# Patient Record
Sex: FEMALE | Race: WHITE | NOT HISPANIC OR LATINO | URBAN - METROPOLITAN AREA
[De-identification: names, ages, dates, MRNs, and addresses within clinical notes are randomized per-mention and may not be internally consistent; named-entity substitution may affect disease eponyms.]

---

## 2023-12-26 ENCOUNTER — OFFICE VISIT (OUTPATIENT)
Dept: PRIMARY CARE | Facility: CLINIC | Age: 58
End: 2023-12-26
Payer: COMMERCIAL

## 2023-12-26 VITALS
BODY MASS INDEX: 26.39 KG/M2 | HEART RATE: 53 BPM | WEIGHT: 149 LBS | SYSTOLIC BLOOD PRESSURE: 122 MMHG | OXYGEN SATURATION: 98 % | DIASTOLIC BLOOD PRESSURE: 80 MMHG

## 2023-12-26 DIAGNOSIS — J45.20 MILD INTERMITTENT ASTHMA WITHOUT COMPLICATION (HHS-HCC): ICD-10-CM

## 2023-12-26 DIAGNOSIS — F41.9 ANXIETY: Primary | ICD-10-CM

## 2023-12-26 PROBLEM — M51.26 LUMBAR DISC HERNIATION: Status: ACTIVE | Noted: 2023-12-26

## 2023-12-26 PROBLEM — M16.10 PRIMARY LOCALIZED OSTEOARTHRITIS OF PELVIC REGION AND THIGH: Status: ACTIVE | Noted: 2023-12-26

## 2023-12-26 PROBLEM — E78.5 HYPERLIPIDEMIA: Status: ACTIVE | Noted: 2023-12-26

## 2023-12-26 PROBLEM — G89.29 OTHER CHRONIC PAIN: Status: ACTIVE | Noted: 2023-12-26

## 2023-12-26 PROBLEM — J01.90 ACUTE NON-RECURRENT SINUSITIS: Status: ACTIVE | Noted: 2023-12-26

## 2023-12-26 PROBLEM — M50.30 DDD (DEGENERATIVE DISC DISEASE), CERVICAL: Status: ACTIVE | Noted: 2023-12-26

## 2023-12-26 PROBLEM — M25.551 RIGHT HIP PAIN: Status: ACTIVE | Noted: 2023-12-26

## 2023-12-26 PROBLEM — K52.9 COLITIS: Status: ACTIVE | Noted: 2023-12-26

## 2023-12-26 PROBLEM — S16.1XXA CERVICAL STRAIN: Status: ACTIVE | Noted: 2023-12-26

## 2023-12-26 PROBLEM — F90.9 ATTENTION DEFICIT HYPERACTIVITY DISORDER (ADHD): Status: ACTIVE | Noted: 2023-12-26

## 2023-12-26 PROBLEM — N18.31 STAGE 3A CHRONIC KIDNEY DISEASE (MULTI): Status: ACTIVE | Noted: 2023-12-26

## 2023-12-26 PROBLEM — K62.5 RECTAL BLEEDING: Status: ACTIVE | Noted: 2023-12-26
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
## 2023-12-26 PROBLEM — M16.12 PRIMARY OSTEOARTHRITIS OF LEFT HIP: Status: ACTIVE | Noted: 2023-12-26

## 2023-12-26 PROBLEM — N95.2 ATROPHIC VAGINITIS: Status: ACTIVE | Noted: 2023-12-26

## 2023-12-26 PROBLEM — S73.199A TEAR OF ACETABULAR LABRUM: Status: ACTIVE | Noted: 2023-12-26

## 2023-12-26 PROBLEM — S39.012A LUMBAR STRAIN: Status: ACTIVE | Noted: 2023-12-26

## 2023-12-26 PROCEDURE — 1036F TOBACCO NON-USER: CPT | Performed by: STUDENT IN AN ORGANIZED HEALTH CARE EDUCATION/TRAINING PROGRAM

## 2023-12-26 PROCEDURE — 99213 OFFICE O/P EST LOW 20 MIN: CPT | Performed by: STUDENT IN AN ORGANIZED HEALTH CARE EDUCATION/TRAINING PROGRAM

## 2023-12-26 RX ORDER — ERGOCALCIFEROL 1.25 MG/1
1.25 CAPSULE ORAL
COMMUNITY
End: 2024-05-17 | Stop reason: WASHOUT

## 2023-12-26 RX ORDER — HYDROXYZINE HYDROCHLORIDE 25 MG/1
25 TABLET, FILM COATED ORAL 3 TIMES DAILY
Qty: 30 TABLET | Refills: 0 | Status: SHIPPED | OUTPATIENT
Start: 2023-12-26 | End: 2024-03-18 | Stop reason: WASHOUT

## 2023-12-26 RX ORDER — MAGNESIUM OXIDE/MAG AA CHELATE 300 MG
CAPSULE ORAL EVERY 24 HOURS
COMMUNITY
End: 2024-05-17 | Stop reason: WASHOUT

## 2023-12-26 RX ORDER — TERBINAFINE HYDROCHLORIDE 250 MG/1
250 TABLET ORAL DAILY
COMMUNITY
End: 2023-12-26 | Stop reason: WASHOUT

## 2023-12-26 RX ORDER — ALBUTEROL SULFATE 90 UG/1
2 AEROSOL, METERED RESPIRATORY (INHALATION) EVERY 6 HOURS PRN
COMMUNITY
Start: 2017-09-20 | End: 2023-12-26 | Stop reason: SDUPTHER

## 2023-12-26 RX ORDER — ALBUTEROL SULFATE 90 UG/1
2 AEROSOL, METERED RESPIRATORY (INHALATION) EVERY 6 HOURS PRN
Qty: 18 G | Refills: 1 | Status: SHIPPED | OUTPATIENT
Start: 2023-12-26 | End: 2024-03-28 | Stop reason: SDUPTHER

## 2023-12-26 ASSESSMENT — PATIENT HEALTH QUESTIONNAIRE - PHQ9
2. FEELING DOWN, DEPRESSED OR HOPELESS: NOT AT ALL
1. LITTLE INTEREST OR PLEASURE IN DOING THINGS: NOT AT ALL
SUM OF ALL RESPONSES TO PHQ9 QUESTIONS 1 AND 2: 0

## 2023-12-26 NOTE — PROGRESS NOTES
Subjective   Patient ID: Trupti Medrano is a 58 y.o. female who presents for New Med Request (Pt is here requesting medication that she can take prior to surgery for her anxiety.-- full hip replacement).    HPI  59 yo female here for hip replacement  Anxiety  Is getting R hip surgery on Jan 2  Done at [facility]  2. Asthma  Cold and exercise induced  Needs refill    Review of Systems  All pertinent positive symptoms are included in the history of present illness.    All other systems have been reviewed and are negative and noncontributory to this patient's current ailments.     No Known Allergies     Immunization History   Administered Date(s) Administered    Pfizer Purple Cap SARS-CoV-2 03/10/2021, 04/09/2021, 12/02/2021       Objective   Vitals:    12/26/23 1007   BP: 122/80   BP Location: Left arm   Patient Position: Sitting   Pulse: 53   SpO2: 98%   Weight: 67.6 kg (149 lb)       Physical Exam  CONSTITUTIONAL - well nourished, well developed, looks like stated age, in no acute distress  SKIN - normal skin color and pigmentation, normal skin turgor without rash, lesions, or nodules visualized  HEAD - no trauma, normocephalic  EYES - extraocular muscles are intact  ENT - atraumatic  NECK - no neck mass was observed  LUNGS - CTA B/L  CARDIAC - regular rate and regular rhythm, no skipped beats, no murmur appreciated  ABDOMEN - no organomegaly, soft, nontender, nondistended, no guarding/rebound/rigidity  EXTREMITIES - no edema, no deformities  MSK - moves limbs equally  NEUROLOGICAL - normal gait, normal balance, alert, oriented and no focal signs  PSYCHIATRIC - alert, pleasant and cordial, age-appropriate  IMMUNOLOGIC - no cervical lymphadenopathy     Assessment/Plan   59 yo female here for hip replacement  Anxiety  Start hydroxyzine prn  2. Asthma  Albuterol prn

## 2023-12-29 ENCOUNTER — TELEPHONE (OUTPATIENT)
Dept: PRIMARY CARE | Facility: CLINIC | Age: 58
End: 2023-12-29
Payer: COMMERCIAL

## 2023-12-29 NOTE — TELEPHONE ENCOUNTER
[facility] called requesting surgical clearance form for patient- Notified them patient would need another appt for this; they stated they will reach out to the patient

## 2024-01-29 ENCOUNTER — TELEPHONE (OUTPATIENT)
Dept: PRIMARY CARE | Facility: CLINIC | Age: 59
End: 2024-01-29
Payer: COMMERCIAL

## 2024-01-29 NOTE — TELEPHONE ENCOUNTER
"Pt left voice message stating that she had a concern in regards to her chart. Pt states that when she went to Physical therapy, they were going through her chart and confirming information. Pt states that physical therapist told pt that her chart had \"stage 3 kidney failure.\" Pt states that she is very confused by this, as pt was never told about this. Pt would like to know if she does have this and as to why no one every told her about this issue. Pt last seen on 12/26/2023.  "

## 2024-01-29 NOTE — TELEPHONE ENCOUNTER
When I last checked her labs in may, her kidney function was normal and not in chronic kidney disease range. As long as I have known her/been taking care of her, she has never had stage 3 kidney disease.

## 2024-02-01 ENCOUNTER — EVALUATION (OUTPATIENT)
Dept: PHYSICAL THERAPY | Facility: CLINIC | Age: 59
End: 2024-02-01
Payer: COMMERCIAL

## 2024-02-01 DIAGNOSIS — M25.551 RIGHT HIP PAIN: Primary | ICD-10-CM

## 2024-02-01 DIAGNOSIS — Z96.641 PRESENCE OF RIGHT ARTIFICIAL HIP JOINT: ICD-10-CM

## 2024-02-01 PROCEDURE — 97162 PT EVAL MOD COMPLEX 30 MIN: CPT | Mod: GP | Performed by: PHYSICAL THERAPIST

## 2024-02-01 PROCEDURE — 97140 MANUAL THERAPY 1/> REGIONS: CPT | Mod: GP | Performed by: PHYSICAL THERAPIST

## 2024-02-01 PROCEDURE — 97110 THERAPEUTIC EXERCISES: CPT | Mod: GP | Performed by: PHYSICAL THERAPIST

## 2024-02-01 ASSESSMENT — PAIN - FUNCTIONAL ASSESSMENT: PAIN_FUNCTIONAL_ASSESSMENT: 0-10

## 2024-02-01 ASSESSMENT — ENCOUNTER SYMPTOMS
OCCASIONAL FEELINGS OF UNSTEADINESS: 1
LOSS OF SENSATION IN FEET: 0
DEPRESSION: 0

## 2024-02-01 ASSESSMENT — PAIN SCALES - GENERAL: PAINLEVEL_OUTOF10: 3

## 2024-02-01 NOTE — PROGRESS NOTES
"      Physical Therapy  Physical Therapy Orthopedic Evaluation      Patient Name: Trupti Medrano  MRN: [id number]  Today's Date: 2/1/2024  Time Calculation  Start Time: 1515  Stop Time: 1608  Time Calculation (min): 53 min    Insurance:    Number of Treatments Authorized: 1 of 30        Insurance Type: Payor: [employer] [facility] / Plan: [employer] [facility] / Product Type: *No Product type* /     Current Problem  1. Right hip pain            General:  Reason for Referral: s/p R JAMEEL (DOS: 1/2/2024)  Referred By: Dr. Lloyd Pride    Past Medical History  Past Medical History Relevant to Rehab: Asthma, Possible HTN    Precautions:   Precautions  STEADI Fall Risk Score (The score of 4 or more indicates an increased risk of falling): 7  Post-Surgical Precautions: Right hip precautions (Anterior Approach)  Precautions Comment: None    Medical History Form: Reviewed (scanned into chart)    Subjective:   Subjective   General Comment: Patient presents for out-patient physical therapy s/p R JAMEEL. She had been having progressively worsening symptoms and pain over the years until she couldn't perform functional tasks or ADL's without siginficant pain. She notes that she now has increased pain in the L hip which will be replaced April 2024.    Onset Date: Onset Date: 01/02/24    Current Condition:   Better    Prior Functional Level: Prior Function Per Pt/Caregiver Report  Level of Hugo: Independent with ADLs and functional transfers  Homemaking Assistance: Needs assistance  Vacuuming: Other (Comment) (Unable to perform)  Cleaning: Other (Comment) (Difficulty with doing all of cleaning during positioning and difficulty with ADL's)  Driving/Transportation: Family/Friend  Vocational: Unemployed  Leisure: Unable to perform hiking, kayaking, paddle boarding    Pain:  Pain Assessment: 0-10  Pain Score: 3  Pain Location: Hip (#1 (C,V): R anterior hip/thigh, 1-7/10, \"burning/pulling/sharp\")  Effect of Pain on Daily " Activities: Ambulation and standing for prolonged periods, ambulating stairs reciprocally    Previous Interventions/Treatments/Previous Tests & Imaging: Physical Therapy Comment: Medical Management: In home PT 2-3x/wk for 4 weeks (Jan 1-Jan 26)    Patients Living Environment: Home Living Comment: Multi-story condo    Primary Language: English    Patient's Goal(s) for Therapy: Improve ROM, strength and tolerance to functional tasks to return to PFL    Red Flags: Do you have any of the following?         Red Flags: None    Objective:  Objective   HIP    Functional Rating Scale     Observation  Observation Comment: Slow guarded movement in all planes; Incision healing well with no openings, bleeding or drainage noted.  Hip Palpation/Joint Mobility   Palpation / Joint Mobility Comment: Tenderness and tightness noted with palpation R anterior hip/thigh  Lumbar AROM     Hip AROM  R hip flexion: (125°): 90° (#1)  L hip flexion: (125°): 85°  R hip abduction: (45°): 30°  L hip abduction: (45°): 28°  R hip ER: (45°): 35°  L hip ER: (45°): 30°  R hip IR: (45°): 15°  L hip IR: (45°): 20°  Hip PROM  R hip flexion: (125°): 92°  R hip abduction: (45°): 33°  Specific Lower Extremity MMT  R Iliopsoas: (5/5): 4-/5  L Iliopsoas: (5/5): 4/5  R Gluteals (prone): (5/5): 4/5 (tested in supine)  L Gluteals (prone): (5/5): 4+/5  R Gluteals (sidelying): (5/5): 4-/5  L Gluteals (sidelying): (5/5): 4/5  R Hip External Rotation: (5/5): 4-/5  L Hip External Rotation: (5/5): 4/5  R knee flexion: (5/5): 4/5  L knee flexion: (5/5): 4+/5  R knee extension: (5/5): 5/5  L knee extension: (5/5): 5/5    Gait  Gait Comment: Ambulation with single-point cane with decreased stance time, stride length and pace    Special Tests  Other: SLS, EO, Firm: L 15 sec, R 8 sec; Instep, EO, Firm: B 25 secs    Outcome Measures:  Other Measures  Lower Extremity Funtional Score (LEFS): 20/80 (25%)     Treatment Performed:  Therapeutic Exercise  Therapeutic Exercise  Activity 1: HEP review and discussion on form and technique from home health exercises.  Instructed patient to maintain with current program and monitor response combined with PT in the clinic.  Therapeutic Exercise Activity 2: PROM left hip flexion, abduction, ER, IR    Manual Therapy  Manual Therapy Activity 1: STM R anterior hip and quad      Outpatient Education  Individual(s) Educated: Patient  Education Provided: Home Exercise Program  Patient/Caregiver Demonstrated Understanding: yes  Education Comment: Review of current form and technique with exercises prescribed by home health PT    Assessment:   Patient is 58 y.o. year old who presents to physical therapy with signs and symptoms consistent with s/p R JAMEEL. Patient has decreased ROM and strength limiting functional mobility and ADLs.  Patient is apprehensive about movement and weight bearing but improved confidence throughout the session as exercises progressed. Patient has increased pain in the contralateral hip which may impact overall prognosis. Patient is motivated to return to [facility] as she lives along and she is having potential surgery on contralateral hip. Patient would benefit from skilled physical therapy in order to address the stated deficits and return to daily tasks with reduced pain and improved function.    SINSS:  Severity: Moderate  Irritability: Moderate  Nature: MSK R hip  Stage: Sub-Acute    Rehab Prognosis: Good      Plan:  Treatment/Interventions: Electrical stimulation, Education/ Instruction, Dry needling, Neuromuscular re-education, Self care/ home management, Therapeutic activities, Therapeutic exercises, Manual therapy, Cryotherapy, Blood flow restriction therapy  PT Plan: Skilled PT  PT Frequency: 2 times per week  Duration: 6 weeks  Onset Date: 01/02/24  Rehab Potential: Good    Goals: Set and discussed today  STG (Expected End 2/27/2024)  1) Patient will improve LEFS score by 9 points in order to perform functional activities at  home and in the community.  2) Patient will be able to complete ADLs with pain less than 4/10 in hip.  3) Pt will improve hip flexion ROM by 100 degrees to be able to complete ADLs with less difficult.  4) Patient will be independent with HEP to allow for continued improvement in daily tasks at home and in the community in 3 visits.     LTG (Expected End 3/28/2024)  1) Patient will have 5/5 strength in lateral hip musculature to aid in stability with ambulation on varied surfaces in community.  2) Patient will be able to perform proper squatting technique in order to prevent increased pain with daily tasks.  3) Patient will be able to perform >30 seconds of SLS on even ground in order to allow for safe ambulation and reduced fall risk within the community.  4) Patient will improve LEFS score by to >/=70/80 in order to perform functional activities at home and in the community by discharge.        Plan of care was developed with input and agreement by the patient        Yonatan Joshi PT

## 2024-02-05 PROBLEM — N18.31 STAGE 3A CHRONIC KIDNEY DISEASE (MULTI): Status: RESOLVED | Noted: 2023-12-26 | Resolved: 2024-02-05

## 2024-02-08 ENCOUNTER — TREATMENT (OUTPATIENT)
Dept: PHYSICAL THERAPY | Facility: CLINIC | Age: 59
End: 2024-02-08
Payer: COMMERCIAL

## 2024-02-08 DIAGNOSIS — M25.551 RIGHT HIP PAIN: Primary | ICD-10-CM

## 2024-02-08 DIAGNOSIS — Z96.641 PRESENCE OF RIGHT ARTIFICIAL HIP JOINT: ICD-10-CM

## 2024-02-08 PROCEDURE — 97110 THERAPEUTIC EXERCISES: CPT | Mod: GP,CQ

## 2024-02-08 PROCEDURE — 97140 MANUAL THERAPY 1/> REGIONS: CPT | Mod: GP,CQ

## 2024-02-08 PROCEDURE — 97530 THERAPEUTIC ACTIVITIES: CPT | Mod: GP,CQ

## 2024-02-08 ASSESSMENT — PAIN SCALES - GENERAL: PAINLEVEL_OUTOF10: 3

## 2024-02-08 ASSESSMENT — PAIN - FUNCTIONAL ASSESSMENT: PAIN_FUNCTIONAL_ASSESSMENT: 0-10

## 2024-02-08 NOTE — PROGRESS NOTES
"  Physical Therapy Treatment    Patient Name: Trupti Medrano  MRN: [id number]  Today's Date: 2/8/2024  Time Calculation  Start Time: 1321  Stop Time: 1418  Time Calculation (min): 57 min    Current Problem  1. Right hip pain  Follow Up In Physical Therapy      2. Presence of right artificial hip joint  Follow Up In Physical Therapy          Insurance:  Number of Treatments Authorized: 2 of 30            Subjective   General  Reason for Referral: s/p R JAMEEL (DOS: 1/2/2024)  Referred By: Dr. Lloyd Pride  Past Medical History Relevant to Rehab: Asthma, Possible HTN (reviewed medical health history - [facility])  General Comment: Patient c/o R hip pinching, which is what she was feeling prior to JAMEEL.  MD castillo/drea 3/12.  Concerned with antalgic gait w/o assistive device.    Performing HEP?:     Precautions  Precautions  STEADI Fall Risk Score (The score of 4 or more indicates an increased risk of falling): 7  Post-Surgical Precautions: Right hip precautions (Anterior Approach)  Precautions Comment: None  Pain  Pain Assessment: 0-10  Pain Score: 3  Pain Location: Hip    Objective   R LE appears longer in stance  Antalgic gait with SPC, short stride L due to R hip flexor tightness     Treatments:    Therapeutic Exercise  Therapeutic Exercise Activity 1: SciFit with elevated seat, position 6 F/R x 6 min Man L1  Therapeutic Exercise Activity 2: incline g/s stretch x 1 min  Therapeutic Exercise Activity 3: heel and toe raises x 20 ea dir  Therapeutic Exercise Activity 4: alt foot tap on 8\" step x 10 ea  Therapeutic Exercise Activity 5: supine L SKTC for R HF stretch 15\" x 5  Therapeutic Exercise Activity 6: HL butterfly groin stretch 10\" x 10  Therapeutic Exercise Activity 7: bridge with ball 10\" x 10  Therapeutic Exercise Activity 8: HL RTB R/L iso clam 2 x 10 min ea         Manual Therapy  Manual Therapy Activity 1: STM/MFR/scar release R ant hip, quad, TFL  Manual Therapy Activity 2: PROM R hip with restrictions  Manual Therapy " Activity 3: stretch ADD    Therapeutic Activity  Therapeutic Activity 1: sit to stand from chair with 1 airex pad x 10  Therapeutic Activity 2: sit to stand from shair without pad x 10  Therapeutic Activity 3: standing staggared stance L forward - stap onto L for R HF stretch and gait normalization  Therapeutic Activity 4: instruct in self STM to scar tissue                OP EDUCATION:  Outpatient Education  Education Comment: Access Code: [email or web address]  URL: [email or web address]  Date: 02/08/2024  Prepared by: Sara Mansfield    Exercises  - Supine Bridge with Mini Swiss Ball Between Knees  - 1 x daily - 7 x weekly - 3 sets - 10 reps - 10 hold  - Hooklying Isometric Clamshell  - 1 x daily - 7 x weekly - 3 sets - 10 reps - 3 hold    Assessment:  PT Assessment  Assessment Comment: Patient tolerated session well.  Much discussion regarding healing process and PLOF. Able to release fascial restrictions R ant hip with resulting decreased burning pain    Plan:  OP PT Plan  Treatment/Interventions: Electrical stimulation, Education/ Instruction, Dry needling, Neuromuscular re-education, Self care/ home management, Therapeutic activities, Therapeutic exercises, Manual therapy, Cryotherapy, Blood flow restriction therapy  PT Plan: Skilled PT  PT Frequency: 2 times per week  Duration: 6 weeks  Onset Date: 01/02/24  Number of Treatments Authorized: 2 of 30  Rehab Potential: Good  Plan of Care Agreement: Patient    Goals:  Active       PT Problem       STG       Start:  02/01/24    Expected End:  02/27/24       1) Patient will improve LEFS score by 9 points in order to perform functional activities at home and in the community.  2) Patient will be able to complete ADLs with pain less than 4/10 in hip.  3) Pt will improve hip flexion ROM by 100 degrees to be able to complete ADLs with less difficult.  4) Patient will be independent with HEP to allow for continued improvement in daily tasks at home and in the  community in 3 visits.          LTG       Start:  02/01/24    Expected End:  03/28/24       1) Patient will have 5/5 strength in lateral hip musculature to aid in stability with ambulation on varied surfaces in community.  2) Patient will be able to perform proper squatting technique in order to prevent increased pain with daily tasks.  3) Patient will be able to perform >30 seconds of SLS on even ground in order to allow for safe ambulation and reduced fall risk within the community.  4) Patient will improve LEFS score by to >/=70/80 in order to perform functional activities at home and in the community by discharge.              Karina Mansfield, PTA

## 2024-02-13 ENCOUNTER — TREATMENT (OUTPATIENT)
Dept: PHYSICAL THERAPY | Facility: CLINIC | Age: 59
End: 2024-02-13
Payer: COMMERCIAL

## 2024-02-13 DIAGNOSIS — M25.551 RIGHT HIP PAIN: ICD-10-CM

## 2024-02-13 DIAGNOSIS — Z96.641 PRESENCE OF RIGHT ARTIFICIAL HIP JOINT: ICD-10-CM

## 2024-02-13 PROCEDURE — 97140 MANUAL THERAPY 1/> REGIONS: CPT | Mod: GP,CQ

## 2024-02-13 PROCEDURE — 97110 THERAPEUTIC EXERCISES: CPT | Mod: GP,CQ

## 2024-02-13 ASSESSMENT — PAIN - FUNCTIONAL ASSESSMENT: PAIN_FUNCTIONAL_ASSESSMENT: 0-10

## 2024-02-13 ASSESSMENT — PAIN SCALES - GENERAL: PAINLEVEL_OUTOF10: 1
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
## 2024-02-15 ENCOUNTER — APPOINTMENT (OUTPATIENT)
Dept: PHYSICAL THERAPY | Facility: CLINIC | Age: 59
End: 2024-02-15
Payer: COMMERCIAL

## 2024-02-15 ENCOUNTER — TREATMENT (OUTPATIENT)
Dept: PHYSICAL THERAPY | Facility: CLINIC | Age: 59
End: 2024-02-15
Payer: COMMERCIAL

## 2024-02-15 DIAGNOSIS — M25.551 RIGHT HIP PAIN: ICD-10-CM

## 2024-02-15 DIAGNOSIS — Z96.641 PRESENCE OF RIGHT ARTIFICIAL HIP JOINT: ICD-10-CM

## 2024-02-15 PROCEDURE — 97140 MANUAL THERAPY 1/> REGIONS: CPT | Mod: GP,CQ

## 2024-02-15 PROCEDURE — 97110 THERAPEUTIC EXERCISES: CPT | Mod: GP,CQ

## 2024-02-15 ASSESSMENT — PAIN SCALES - GENERAL: PAINLEVEL_OUTOF10: 0 - NO PAIN

## 2024-02-15 ASSESSMENT — PAIN - FUNCTIONAL ASSESSMENT: PAIN_FUNCTIONAL_ASSESSMENT: 0-10

## 2024-02-15 NOTE — PROGRESS NOTES
Physical Therapy Treatment    Patient Name: Trupti Medrano  MRN: [id number]  Today's Date: 2/15/2024  Time Calculation  Start Time: 1248  Stop Time: 1335  Time Calculation (min): 47 min  PT Therapeutic Procedures Time Entry  Manual Therapy Time Entry: 12  Neuromuscular Re-Education Time Entry: 6  Therapeutic Exercise Time Entry: 29,      Current Problem  1. Right hip pain  Follow Up In Physical Therapy      2. Presence of right artificial hip joint  Follow Up In Physical Therapy            Insurance:  Payor: [employer] [facility] / Plan: [employer] [facility] / Product Type: *No Product type* /   Number of Treatments Authorized: 4 of 30          Subjective   General  Reason for Referral: s/p R JAMEEL (DOS: 1/2/2024)  Referred By: Dr. Lloyd Pride  Past Medical History Relevant to Rehab: Asthma, Possible HTN (reviewed medical health history - RLJORDIN, EDI BRANNON)  General Comment: PT STATES SHE WAS WALKING WITHOUT HER CANE YESTERDAY.  SHE FELT REALLY GOOD DOING IT AND THE BEST SHE HAS IN AWHILE. NO PAIN TODAY, JUST TIGHTNESS.    Performing HEP?:     Precautions  Precautions  Post-Surgical Precautions: Right hip precautions (Anterior Approach)  Precautions Comment: None  Pain  Pain Assessment: 0-10  Pain Score: 0 - No pain  Pain Location: Hip  Pain Orientation: Right, Anterior, Outer, Mid, Upper    Objective   General Observation  General Observation: PT AMB WITH CANE AND ANTALGIC GAIT       Treatments:    Therapeutic Exercise  Therapeutic Exercise Activity 1: SciFit MAN L2 X 6 MIN  Therapeutic Exercise Activity 2: incline g/s stretch x 1 min  Therapeutic Exercise Activity 3: heel and toe raises x 1 MIN  Therapeutic Exercise Activity 4: DYNAMICS TIN SOLDIER, MARCH, BUTT KICK, SIDE STEP  Therapeutic Exercise Activity 5: FOOTWORM YELLOW LOOP 2 X 30'  Therapeutic Exercise Activity 6: TG L7 SQUATS X 2 MIN  Therapeutic Exercise Activity 7: SCOOTER 2 X 40'    Balance/Neuromuscular Re-Education  Balance/Neuromuscular  Re-Education Activity 1: AIREX SLS R/L X 1 MIN  Balance/Neuromuscular Re-Education Activity 2: TILT BOARD S/S BALANCE X 2 MIN    Manual Therapy  Manual Therapy Activity 1: STM/MFR/scar release R ant hip, quad, TFL  Manual Therapy Activity 2: STRETCH QUAD, HIP FLEX                   OP EDUCATION:  Outpatient Education  Education Comment: Access Code: [email or web address]  URL: [email or web address]  Date: 02/15/2024  Prepared by: Don Aquino    Exercises  - Dynamic Straight Leg Kicks  - 1 x daily - 7 x weekly - 3 sets - 10 reps  - Walking Butt Kicks  - 1 x daily - 7 x weekly - 3 sets - 10 reps  - Single Knee to Chest  - 1 x daily - 7 x weekly - 3 sets - 10 reps  - Side Stepping with Resistance at Feet  - 1 x daily - 7 x weekly - 1 sets - 10-20 reps  - Prone on Elbows Stretch  - 1-2 x daily - 7 x weekly - 1-2 sets - 1-2 hold    Assessment:  PT Assessment  Assessment Comment: PT MARITZA EX'S BETTER TODAY.  SHE IS AMB WITH DECREASED ANTALGIC GAIT.  ABLE TO GET MORE HIP EXT TODAY.  PT IS STILL TIGHT AND TENDER IN HER R QUAD AND SCARS.  PT FELT LOOSER AFTER SESSION.  PT IS PROGRESSING TOWARDS GOALS.    Plan:  OP PT Plan  Treatment/Interventions: Electrical stimulation, Education/ Instruction, Dry needling, Neuromuscular re-education, Self care/ home management, Therapeutic activities, Therapeutic exercises, Manual therapy, Cryotherapy, Blood flow restriction therapy  PT Plan: Skilled PT  PT Frequency: 2 times per week  Duration: 6 weeks  Onset Date: 01/02/24  Number of Treatments Authorized: 4 of 30  Rehab Potential: Good  Plan of Care Agreement: Patient    Goals:  Active       PT Problem       STG       Start:  02/01/24    Expected End:  02/27/24       1) Patient will improve LEFS score by 9 points in order to perform functional activities at home and in the community.  2) Patient will be able to complete ADLs with pain less than 4/10 in hip.  3) Pt will improve hip flexion ROM by 100 degrees to be able to  complete ADLs with less difficult.  4) Patient will be independent with HEP to allow for continued improvement in daily tasks at home and in the community in 3 visits.          LTG       Start:  02/01/24    Expected End:  03/28/24       1) Patient will have 5/5 strength in lateral hip musculature to aid in stability with ambulation on varied surfaces in community.  2) Patient will be able to perform proper squatting technique in order to prevent increased pain with daily tasks.  3) Patient will be able to perform >30 seconds of SLS on even ground in order to allow for safe ambulation and reduced fall risk within the community.  4) Patient will improve LEFS score by to >/=70/80 in order to perform functional activities at home and in the community by discharge.              Scooter Aquino, PTA

## 2024-02-20 ENCOUNTER — TREATMENT (OUTPATIENT)
Dept: PHYSICAL THERAPY | Facility: CLINIC | Age: 59
End: 2024-02-20
Payer: COMMERCIAL

## 2024-02-20 DIAGNOSIS — M25.551 RIGHT HIP PAIN: ICD-10-CM

## 2024-02-20 DIAGNOSIS — Z96.641 PRESENCE OF RIGHT ARTIFICIAL HIP JOINT: ICD-10-CM

## 2024-02-20 PROCEDURE — 97110 THERAPEUTIC EXERCISES: CPT | Mod: GP | Performed by: PHYSICAL THERAPIST

## 2024-02-20 PROCEDURE — 97140 MANUAL THERAPY 1/> REGIONS: CPT | Mod: GP | Performed by: PHYSICAL THERAPIST

## 2024-02-20 ASSESSMENT — PAIN - FUNCTIONAL ASSESSMENT: PAIN_FUNCTIONAL_ASSESSMENT: 0-10

## 2024-02-20 ASSESSMENT — PAIN SCALES - GENERAL: PAINLEVEL_OUTOF10: 1

## 2024-02-20 NOTE — PROGRESS NOTES
"  Physical Therapy Treatment    Patient Name: Trupti Medrano  MRN: [id number]  Today's Date: 2/20/2024  Time Calculation  Start Time: 1515  Stop Time: 1600  Time Calculation (min): 45 min  PT Therapeutic Procedures Time Entry  Manual Therapy Time Entry: 12  Neuromuscular Re-Education Time Entry: 6  Therapeutic Exercise Time Entry: 25       Current Problem  1. Right hip pain  Follow Up In Physical Therapy      2. Presence of right artificial hip joint  Follow Up In Physical Therapy          Insurance:  Number of Treatments Authorized: 5 of 30        Payor: [facility] / Plan: [facility] / Product Type: *No Product type* /     Subjective   General  Reason for Referral: s/p R JAMEEL (DOS: 1/2/2024)  Referred By: Dr. Lloyd Pride  Past Medical History Relevant to Rehab: Asthma, Possible HTN (reviewed medical health history - DICK, EDI BRANNON)  General Comment: Patient reports that she is feeling good overall.  She does note some intermittent right anterior hip pain particularly with hip flexion movement patterns.  She states she has been compliant with HEP and tolerating those exercises well.    Performing HEP?: Yes    Precautions  Precautions  STEADI Fall Risk Score (The score of 4 or more indicates an increased risk of falling): 7  Post-Surgical Precautions: Right hip precautions (Anterior Approach)  Precautions Comment: None  Pain  Pain Assessment: 0-10  Pain Score: 1  Pain Location: Hip (#1 (C,V): R anterior hip/thigh, 1-7/10, \"burning/pulling/sharp\")  Pain Orientation: Right, Anterior, Outer, Mid, Upper       Objective   HIP    Hip AROM  R hip flexion: (125°): 90° (#1)  L hip flexion: (125°): 85°  R hip abduction: (45°): 30°  L hip abduction: (45°): 28°  R hip ER: (45°): 35°  L hip ER: (45°): 30°  R hip IR: (45°): 15°  L hip IR: (45°): 20°    General Observation  General Observation: Patient ambulating with single-point cane upon entering clinic today    Treatments:    Therapeutic " Exercise  Therapeutic Exercise Activity 1: SciFit, Manual, L2, 6 mins  Therapeutic Exercise Activity 2: Gastroc Stretch, incline, 3x30 secs  Therapeutic Exercise Activity 3: HS stretch, foot on stool, 3x30 secs  Therapeutic Exercise Activity 4: Dynamics: High knees, Butt kicks, tin soldiers, x 40 feet each  Therapeutic Exercise Activity 5: Stool Scoots, 2x40 feet  Therapeutic Exercise Activity 6: Heel/Toe Raises 2 mins  Therapeutic Exercise Activity 7: Lateral Touch Down R, 4 inch, 1x10    Balance/Neuromuscular Re-Education  Balance/Neuromuscular Re-Education Activity 1: SLS, AirEx, R/L, 4x20 secs each  Balance/Neuromuscular Re-Education Activity 2: Tiltboard Balance M/L, 3x30 secs    Assessment:  PT Assessment  Assessment Comment: Patient with good tolerance to treatment today with no increased pain or symptoms noted throughout.  Patient challenged with balance exercises and able to self-correct with upper extremity assist when LOB occurred.  Patient continues to have decreased soft tissue mobility right anterior hip particularly along incision possibly due to scar tissue mobility restrictions.  Patient  for return to ADLs at prior functional level.continues to demonstrate positive response overall to manual therapy and exercise evidenced by decreased assistive device using controlled environments.  Will continue to progress and monitor patient response    Plan:     PT Plan: Skilled PT (Progress strengthening, range of motion and functional tasks for performance of ADLs.)        Onset Date: 01/02/24  Rehab Potential: Javier Joshi, PT

## 2024-02-22 ENCOUNTER — TREATMENT (OUTPATIENT)
Dept: PHYSICAL THERAPY | Facility: CLINIC | Age: 59
End: 2024-02-22
Payer: COMMERCIAL

## 2024-02-22 DIAGNOSIS — Z96.641 PRESENCE OF RIGHT ARTIFICIAL HIP JOINT: ICD-10-CM

## 2024-02-22 DIAGNOSIS — M25.551 RIGHT HIP PAIN: ICD-10-CM

## 2024-02-22 PROCEDURE — 97110 THERAPEUTIC EXERCISES: CPT | Mod: GP,CQ

## 2024-02-22 PROCEDURE — 97140 MANUAL THERAPY 1/> REGIONS: CPT | Mod: GP,CQ

## 2024-02-22 ASSESSMENT — PAIN SCALES - GENERAL: PAINLEVEL_OUTOF10: 0 - NO PAIN

## 2024-02-22 ASSESSMENT — PAIN - FUNCTIONAL ASSESSMENT: PAIN_FUNCTIONAL_ASSESSMENT: 0-10

## 2024-02-22 NOTE — PROGRESS NOTES
Physical Therapy Treatment    Patient Name: Trupti Medrano  MRN: [id number]  Today's Date: 2/22/2024  Time Calculation  Start Time: 1201  Stop Time: 1248  Time Calculation (min): 47 min  PT Therapeutic Procedures Time Entry  Manual Therapy Time Entry: 12  Neuromuscular Re-Education Time Entry: 6  Therapeutic Exercise Time Entry: 29,      Current Problem  1. Right hip pain  Follow Up In Physical Therapy      2. Presence of right artificial hip joint  Follow Up In Physical Therapy            Insurance:  Payor: [facility] [facility] / Plan: [employer] [facility] / Product Type: *No Product type* /   Number of Treatments Authorized: 6 of 30          Subjective   General  Reason for Referral: s/p R JAMEEL (DOS: 1/2/2024)  Referred By: Dr. Lloyd Pride  Past Medical History Relevant to Rehab: Asthma, Possible HTN (reviewed medical health history - RLM, EDI BRANNON)  General Comment: PT STATES SHE IS WALKING BETTER.  SHE DOES FEEL HER R LE OUT MORE WHEN WALKING BECAUSE OF R LE BEING LONGER RIGHT NOW.    Performing HEP?: Yes    Precautions  Precautions  Post-Surgical Precautions: Right hip precautions (Anterior Approach)  Precautions Comment: None  Pain  Pain Assessment: 0-10  Pain Score: 0 - No pain  Pain Location: Hip  Pain Orientation: Right, Anterior    Objective   General Observation  General Observation: Patient ambulating with single-point cane upon entering clinic today       Treatments:    Therapeutic Exercise  Therapeutic Exercise Activity 1: SciFit, Manual, L2, 6 mins  Therapeutic Exercise Activity 2: Gastroc Stretch, incline, 3x30 secs  Therapeutic Exercise Activity 3: INCLINE HEEL RAISES X 1 MIN  Therapeutic Exercise Activity 4: Dynamics: High knees, Butt kicks, tin soldiers, HIP FLEX MINIMAL  Therapeutic Exercise Activity 5: FOOTWORM GREEN LOOP 2 X 40', ZIG ZAG - MONSTER FWD 2 X 40'  Therapeutic Exercise Activity 6: SCOOTER 4 X 40'  Therapeutic Exercise Activity 7: TG L7 SQUATS YELLOW BAND X 2  MIN    Balance/Neuromuscular Re-Education  Balance/Neuromuscular Re-Education Activity 1: SLS, AirEx, R/L, X 1 MIN EACH  Balance/Neuromuscular Re-Education Activity 2: Tiltboard Balance M/L, X 2 MIN  Balance/Neuromuscular Re-Education Activity 3: BOSU FWD LUNGE // BAR X 2 MIN    Manual Therapy  Manual Therapy Activity 1: STM/MFR/scar release R ant hip, quad, TFL  Manual Therapy Activity 2: STRETCH QUAD, HIP FLEX                   OP EDUCATION:  Outpatient Education  Education Comment: CONTINUE WITH CURRENT HEP    Assessment:  PT Assessment  Assessment Comment: PT IS MARITZA EX'S BETTER.  NOTED INCREASE IN STRENGTH AND BALANCE.  GAIT IS IMPROVING ALSO.  PT'S SCAR IS STILL HARD AND QUAD IS TIGHT.  PT FELT BETTER AFTER SESSION.    Plan:  OP PT Plan  PT Plan: Skilled PT (Progress strengthening, range of motion and functional tasks for performance of ADLs.)  Onset Date: 01/02/24  Number of Treatments Authorized: 6 of 30  Rehab Potential: Good  Plan of Care Agreement: Patient    Goals:  Active       PT Problem       STG       Start:  02/01/24    Expected End:  02/27/24       1) Patient will improve LEFS score by 9 points in order to perform functional activities at home and in the community.  2) Patient will be able to complete ADLs with pain less than 4/10 in hip.  3) Pt will improve hip flexion ROM by 100 degrees to be able to complete ADLs with less difficult.  4) Patient will be independent with HEP to allow for continued improvement in daily tasks at home and in the community in 3 visits.          LTG       Start:  02/01/24    Expected End:  03/28/24       1) Patient will have 5/5 strength in lateral hip musculature to aid in stability with ambulation on varied surfaces in community.  2) Patient will be able to perform proper squatting technique in order to prevent increased pain with daily tasks.  3) Patient will be able to perform >30 seconds of SLS on even ground in order to allow for safe ambulation and reduced fall  risk within the community.  4) Patient will improve LEFS score by to >/=70/80 in order to perform functional activities at home and in the community by discharge.              Scooter Aquino, PTA

## 2024-02-27 ENCOUNTER — TREATMENT (OUTPATIENT)
Dept: PHYSICAL THERAPY | Facility: CLINIC | Age: 59
End: 2024-02-27
Payer: COMMERCIAL

## 2024-02-27 DIAGNOSIS — Z96.641 PRESENCE OF RIGHT ARTIFICIAL HIP JOINT: ICD-10-CM

## 2024-02-27 DIAGNOSIS — M25.551 RIGHT HIP PAIN: ICD-10-CM

## 2024-02-27 PROCEDURE — 97110 THERAPEUTIC EXERCISES: CPT | Mod: GP,CQ

## 2024-02-27 PROCEDURE — 97140 MANUAL THERAPY 1/> REGIONS: CPT | Mod: GP,CQ

## 2024-02-27 ASSESSMENT — PAIN SCALES - GENERAL: PAINLEVEL_OUTOF10: 1

## 2024-02-27 ASSESSMENT — PAIN - FUNCTIONAL ASSESSMENT: PAIN_FUNCTIONAL_ASSESSMENT: 0-10

## 2024-02-27 ASSESSMENT — PAIN DESCRIPTION - DESCRIPTORS: DESCRIPTORS: BURNING

## 2024-02-27 NOTE — PROGRESS NOTES
Physical Therapy Treatment    Patient Name: Trupti Medrano  MRN: [id number]  Today's Date: 2/27/2024  Time Calculation  Start Time: 1519  Stop Time: 1603  Time Calculation (min): 44 min  PT Therapeutic Procedures Time Entry  Manual Therapy Time Entry: 20  Neuromuscular Re-Education Time Entry: 4  Therapeutic Exercise Time Entry: 20,      Current Problem  1. Right hip pain  Follow Up In Physical Therapy      2. Presence of right artificial hip joint  Follow Up In Physical Therapy            Insurance:  Payor: [employer] [facility] / Plan: [employer] [facility] / Product Type: *No Product type* /   Number of Treatments Authorized: 7 of 30          Subjective   General  Reason for Referral: s/p R JAMEEL (DOS: 1/2/2024)  Referred By: Dr. Lloyd Pride  Past Medical History Relevant to Rehab: Asthma, Possible HTN (reviewed medical health history - RL, EDI BRANNON)  General Comment: PT STATES HER R HIP CONTINUES TO FEEL BETTER, BUT SHE DOES HAVE SOME BURNING IN HER R THIGH.    Performing HEP?: Yes    Precautions  Precautions  Post-Surgical Precautions: Right hip precautions (Anterior Approach)  Precautions Comment: None  Pain  Pain Assessment: 0-10  Pain Score: 1  Pain Location: Hip  Pain Orientation: Right, Anterior  Pain Descriptors: Burning    Objective   General Observation  General Observation: Patient ambulating with single-point cane upon entering clinic today       Treatments:    Therapeutic Exercise  Therapeutic Exercise Activity 1: SciFit, HILLS L3 X 6 MIN  Therapeutic Exercise Activity 2: Gastroc Stretch, X 1 MIN  Therapeutic Exercise Activity 3: INCLINE HEEL RAISES X 1 MIN  Therapeutic Exercise Activity 4: Dynamics: High knees, Butt kicks, tin soldiers, HIP FLEX MINIMAL  Therapeutic Exercise Activity 5: FOOTWORM GREEN LOOP 2 X 40', ZIG ZAG - MONSTER FWD 2 X 40'  Therapeutic Exercise Activity 6: SCOOTER 4 X 40'  Therapeutic Exercise Activity 7: TG L7 SQUATS YELLOW BAND X 2 MIN    Balance/Neuromuscular  Re-Education  Balance/Neuromuscular Re-Education Activity 1: SLS, AirEx, R/L, X 1 MIN EACH  Balance/Neuromuscular Re-Education Activity 2: Tiltboard Balance M/L, X 2 MIN    Manual Therapy  Manual Therapy Activity 1: STM/MFR/scar release R ant hip, quad, TFL  Manual Therapy Activity 2: STRETCH QUAD, HIP FLEX                   OP EDUCATION:  Outpatient Education  Education Comment: CONTINUE WITH CURRENT HEP    Assessment:  PT Assessment  Assessment Comment: PT MARITZA EX'S WELL. SHE DID HAVE SOME DISCOMFORT IN HER R HIP WHEN SHE PERFORMED SLS ON [employer].  HER SCAR IS STILL SENSITIVE AND HARD.  HER ROM AND FLEXIBILITY ARE IMPROVING. PT'S GAIT HAS IMPROVED QUITE A BIT.    Plan:  OP PT Plan  PT Plan: Skilled PT (Progress strengthening, range of motion and functional tasks for performance of ADLs.)  Onset Date: 01/02/24  Number of Treatments Authorized: 7 of 30  Rehab Potential: Good  Plan of Care Agreement: Patient    Goals:  Active       PT Problem       STG       Start:  02/01/24    Expected End:  02/27/24       1) Patient will improve LEFS score by 9 points in order to perform functional activities at home and in the community.  2) Patient will be able to complete ADLs with pain less than 4/10 in hip.  3) Pt will improve hip flexion ROM by 100 degrees to be able to complete ADLs with less difficult.  4) Patient will be independent with HEP to allow for continued improvement in daily tasks at home and in the community in 3 visits.          LTG       Start:  02/01/24    Expected End:  03/28/24       1) Patient will have 5/5 strength in lateral hip musculature to aid in stability with ambulation on varied surfaces in community.  2) Patient will be able to perform proper squatting technique in order to prevent increased pain with daily tasks.  3) Patient will be able to perform >30 seconds of SLS on even ground in order to allow for safe ambulation and reduced fall risk within the community.  4) Patient will improve LEFS score  by to >/=70/80 in order to perform functional activities at home and in the community by discharge.              Scooter Aquino, PTA

## 2024-02-29 ENCOUNTER — TREATMENT (OUTPATIENT)
Dept: PHYSICAL THERAPY | Facility: CLINIC | Age: 59
End: 2024-02-29
Payer: COMMERCIAL

## 2024-02-29 DIAGNOSIS — Z96.641 PRESENCE OF RIGHT ARTIFICIAL HIP JOINT: ICD-10-CM

## 2024-02-29 DIAGNOSIS — M25.551 RIGHT HIP PAIN: ICD-10-CM

## 2024-02-29 PROCEDURE — 97140 MANUAL THERAPY 1/> REGIONS: CPT | Mod: GP,CQ

## 2024-02-29 PROCEDURE — 97110 THERAPEUTIC EXERCISES: CPT | Mod: GP,CQ

## 2024-02-29 ASSESSMENT — PAIN - FUNCTIONAL ASSESSMENT: PAIN_FUNCTIONAL_ASSESSMENT: 0-10

## 2024-02-29 ASSESSMENT — PAIN DESCRIPTION - DESCRIPTORS: DESCRIPTORS: SHARP;BURNING

## 2024-02-29 ASSESSMENT — PAIN SCALES - GENERAL: PAINLEVEL_OUTOF10: 1

## 2024-02-29 NOTE — PROGRESS NOTES
Physical Therapy Treatment    Patient Name: Trupti Medrano  MRN: [id number]  Today's Date: 2/29/2024  Time Calculation  Start Time: 1201  Stop Time: 1247  Time Calculation (min): 46 min  PT Therapeutic Procedures Time Entry  Manual Therapy Time Entry: 36  Therapeutic Exercise Time Entry: 10,      Current Problem  1. Right hip pain  Follow Up In Physical Therapy      2. Presence of right artificial hip joint  Follow Up In Physical Therapy            Insurance:  Payor: [facility] / Plan: [facility] / Product Type: *No Product type* /   Number of Treatments Authorized: 8 of 30          Subjective   General  Reason for Referral: s/p R JAMEEL (DOS: 1/2/2024)  Referred By: Dr. Lloyd Pride  Past Medical History Relevant to Rehab: Asthma, Possible HTN (reviewed medical health history - RLM, EDI BRANNON)  General Comment: PT STATES SHE IS GETTING SOME PAIN IN HER R GROIN WHEN SHE DOES SLR.  SHE GETS BURNING SENSATION DOWN HER R THIGH TOO.    Performing HEP?: Yes    Precautions  Precautions  Post-Surgical Precautions: Right hip precautions (Anterior Approach)  Precautions Comment: None  Pain  Pain Assessment: 0-10  Pain Score: 1  Pain Location: Hip  Pain Orientation: Right, Anterior  Pain Descriptors: Sharp, Burning    Objective   General Observation  General Observation: PT BROUGHT CANE BECAUSE SNOW ON GROUND       Treatments:    Therapeutic Exercise  Therapeutic Exercise Activity 1: SciFit, HILLS L3 X 6 MIN  Therapeutic Exercise Activity 2: Gastroc Stretch, X 1 MIN  Therapeutic Exercise Activity 3: Dynamics: High knees, Butt kicks, tin soldiers, HIP FLEX MINIMAL    Balance/Neuromuscular Re-Education  Balance/Neuromuscular Re-Education Activity Performed: No    Manual Therapy  Manual Therapy Activity 1: STM/MFR/scar release R ant hip, quad, TFL  Manual Therapy Activity 2: STRETCH QUAD, HIP FLEX  Manual Therapy Activity 3: MFR R LEG PULL                   OP EDUCATION:  Outpatient  Education  Education Comment: CONTINUE WITH CURRENT HEP.    Assessment:  PT Assessment  Assessment Comment: PT CONTINUES TO BE TIGHT AND TENDER IN HER R QUAD AND SCAR.  PT FELT LOOSER AND HAD LESS PAIN AFTER TODAY'S SESSION.    Plan:  OP PT Plan  PT Plan: Skilled PT (Progress strengthening, range of motion and functional tasks for performance of ADLs., PT TO STRETCH AND MASSAGE FOR THE NEXT COUPLE OF DAYS AND REST FROM STRENGTHENING EX'S)  Onset Date: 01/02/24  Number of Treatments Authorized: 8 of 30  Rehab Potential: Good  Plan of Care Agreement: Patient    Goals:  Active       PT Problem       STG       Start:  02/01/24    Expected End:  02/27/24       1) Patient will improve LEFS score by 9 points in order to perform functional activities at home and in the community.  2) Patient will be able to complete ADLs with pain less than 4/10 in hip.  3) Pt will improve hip flexion ROM by 100 degrees to be able to complete ADLs with less difficult.  4) Patient will be independent with HEP to allow for continued improvement in daily tasks at home and in the community in 3 visits.          LTG       Start:  02/01/24    Expected End:  03/28/24       1) Patient will have 5/5 strength in lateral hip musculature to aid in stability with ambulation on varied surfaces in community.  2) Patient will be able to perform proper squatting technique in order to prevent increased pain with daily tasks.  3) Patient will be able to perform >30 seconds of SLS on even ground in order to allow for safe ambulation and reduced fall risk within the community.  4) Patient will improve LEFS score by to >/=70/80 in order to perform functional activities at home and in the community by discharge.              Scooter Aquino, PTA

## 2024-03-06 ENCOUNTER — TREATMENT (OUTPATIENT)
Dept: PHYSICAL THERAPY | Facility: CLINIC | Age: 59
End: 2024-03-06
Payer: COMMERCIAL

## 2024-03-06 DIAGNOSIS — Z96.641 PRESENCE OF RIGHT ARTIFICIAL HIP JOINT: ICD-10-CM

## 2024-03-06 DIAGNOSIS — M25.551 RIGHT HIP PAIN: Primary | ICD-10-CM

## 2024-03-06 PROCEDURE — 97110 THERAPEUTIC EXERCISES: CPT | Mod: GP,CQ

## 2024-03-06 PROCEDURE — 97140 MANUAL THERAPY 1/> REGIONS: CPT | Mod: GP,CQ

## 2024-03-06 ASSESSMENT — PAIN SCALES - GENERAL: PAINLEVEL_OUTOF10: 0 - NO PAIN

## 2024-03-06 ASSESSMENT — PAIN - FUNCTIONAL ASSESSMENT: PAIN_FUNCTIONAL_ASSESSMENT: 0-10

## 2024-03-06 NOTE — PROGRESS NOTES
"  Physical Therapy Treatment    Patient Name: Trupti Medrano  MRN: [id number]  Today's Date: 3/6/2024  Time Calculation  Start Time: 1412  Stop Time: 1507  Time Calculation (min): 55 min   ,      Current Problem  1. Right hip pain  Follow Up In Physical Therapy      2. Presence of right artificial hip joint  Follow Up In Physical Therapy          Insurance:  Number of Treatments Authorized: 9 of 30            Subjective   General  Reason for Referral: s/p R JAMEEL (DOS: 1/2/2024)  Referred By: Dr. Lloyd Pride  Past Medical History Relevant to Rehab: Asthma, Possible HTN (reviewed medical health history - DICK, EDI BRANNON)  General Comment: Patient had been taking it easy since her last PT visit and the R groin pain she was feeling has resolved.  She is scheduled for L JAMEEL in early April.    Performing HEP?: Partially - backed off a bit due to increased groin pain    Precautions  Precautions  Post-Surgical Precautions: Right hip precautions (Anterior Approach)  Precautions Comment: None  Pain  Pain Assessment: 0-10  Pain Score: 0 - No pain  Pain Location: Hip  Pain Orientation: Right, Anterior    Objective   Lacks R hip ext to neutral with gait    Treatments:    Therapeutic Exercise  Therapeutic Exercise Activity 1: SciFit, HILLS L3 X 6 MIN  Therapeutic Exercise Activity 2: incone G/S stretch x 1 mi n  Therapeutic Exercise Activity 3: incline heel raises x 1 mi n  Therapeutic Exercise Activity 4: prone alt iso glut with minimal hip ext x 10 ea  Therapeutic Exercise Activity 5: FALLON x 2 min  Therapeutic Exercise Activity 6: SL YTB loop resisted R clam 2 x 10  Therapeutic Exercise Activity 7: bridge 10\" 2 x 10         Manual Therapy  Manual Therapy Activity 1: MFR R leg pull  Manual Therapy Activity 2: MFR and CFM to R ant hip, scar tissue mobs, iliopsoas and ADD release,  Manual Therapy Activity 3: stretch R hip to neutral extension, ADDs    Therapeutic Activity  Therapeutic Activity 1: FWD stepping over 2 - 6\" hurdles " "R/L lead x 5 passes  Therapeutic Activity 2: LAT stepping over 2 - 6\" hurdles R/L x 5 passes  Therapeutic Activity 3: FWD stepping over 2 - 12\" hurdles x 3 passes - increased R ant hip pain                OP EDUCATION:  Outpatient Education  Education Comment: Resume HEP at 1/2 of prior.  Caution hip flexor tendonitis    Assessment:  PT Assessment  Assessment Comment: Patient's R ant hip catching pain subsided with rest over the past few days.  Reaggrivated with high stepping over 12\" hurdles.  Soft tissue releases and glut strengthening this visit. + ADD mm tightness, which was addressed as well.    Plan:  OP PT Plan  PT Plan: Skilled PT (Progress strengthening, range of motion and functional tasks for performance of ADLs., PT TO STRETCH AND MASSAGE FOR THE NEXT COUPLE OF DAYS AND REST FROM STRENGTHENING EX'S)  Onset Date: 01/02/24  Number of Treatments Authorized: 9 of 30  Rehab Potential: Good  Plan of Care Agreement: Patient    Goals:  Active       PT Problem       STG       Start:  02/01/24    Expected End:  02/27/24       1) Patient will improve LEFS score by 9 points in order to perform functional activities at home and in the community.  2) Patient will be able to complete ADLs with pain less than 4/10 in hip.  3) Pt will improve hip flexion ROM by 100 degrees to be able to complete ADLs with less difficult.  4) Patient will be independent with HEP to allow for continued improvement in daily tasks at home and in the community in 3 visits.          LTG       Start:  02/01/24    Expected End:  03/28/24       1) Patient will have 5/5 strength in lateral hip musculature to aid in stability with ambulation on varied surfaces in community.  2) Patient will be able to perform proper squatting technique in order to prevent increased pain with daily tasks.  3) Patient will be able to perform >30 seconds of SLS on even ground in order to allow for safe ambulation and reduced fall risk within the community.  4) Patient " will improve LEFS score by to >/=70/80 in order to perform functional activities at home and in the community by discharge.                   Karina Mansfield, PTA

## 2024-03-08 ENCOUNTER — APPOINTMENT (OUTPATIENT)
Dept: PHYSICAL THERAPY | Facility: CLINIC | Age: 59
End: 2024-03-08
Payer: COMMERCIAL

## 2024-03-11 ENCOUNTER — TREATMENT (OUTPATIENT)
Dept: PHYSICAL THERAPY | Facility: CLINIC | Age: 59
End: 2024-03-11
Payer: COMMERCIAL

## 2024-03-11 DIAGNOSIS — Z96.641 PRESENCE OF RIGHT ARTIFICIAL HIP JOINT: ICD-10-CM

## 2024-03-11 DIAGNOSIS — M25.551 RIGHT HIP PAIN: Primary | ICD-10-CM

## 2024-03-11 PROCEDURE — 97140 MANUAL THERAPY 1/> REGIONS: CPT | Mod: GP,CQ

## 2024-03-11 PROCEDURE — 97010 HOT OR COLD PACKS THERAPY: CPT | Mod: GP,CQ

## 2024-03-11 PROCEDURE — 97110 THERAPEUTIC EXERCISES: CPT | Mod: GP,CQ

## 2024-03-11 ASSESSMENT — PAIN SCALES - GENERAL: PAINLEVEL_OUTOF10: 2

## 2024-03-11 ASSESSMENT — PAIN - FUNCTIONAL ASSESSMENT: PAIN_FUNCTIONAL_ASSESSMENT: 0-10

## 2024-03-11 NOTE — PROGRESS NOTES
"  Physical Therapy Treatment    Patient Name: Trupti Medrano  MRN: [id number]  Today's Date: 3/11/2024  Time Calculation  Start Time: 1738  Stop Time: 1840  Time Calculation (min): 62 min   ,      Current Problem  1. Right hip pain        2. Presence of right artificial hip joint            Insurance:  Number of Treatments Authorized: 10 of 30            Subjective   General  Reason for Referral: s/p R JAMEEL (DOS: 1/2/2024)  Referred By: Dr. Lloyd Pride  Past Medical History Relevant to Rehab: Asthma, Possible HTN (reviewed medical health history - DICK, EDI BRANNON)  General Comment: Patient primarily c/o R ant thigh soft tissue tightness.  Appointment with her surgeon on Wednesday.    Performing HEP?: Yes    Precautions  Precautions  Post-Surgical Precautions: Right hip precautions (Anterior Approach)  Precautions Comment: None  Pain  Pain Assessment: 0-10  Pain Score: 2  Pain Location: Hip  Pain Orientation: Right, Anterior    Objective   Antalgic gait  Diminished WB R LE    Treatments:    Therapeutic Exercise  Therapeutic Exercise Activity 1: SciFit, HILLS L3 X 6 MIN  Therapeutic Exercise Activity 2: bridge with GTB ABD 5\" x 2 min  Therapeutic Exercise Activity 3: FALLON x 2 min  Therapeutic Exercise Activity 4: prone alt iso glut with minimal hip ext x 10 ea  Therapeutic Exercise Activity 5: prone RTB resisted R HS curl x 10  Therapeutic Exercise Activity 6: 4\" R lat step ups 2 x 10  Therapeutic Exercise Activity 7: hip hiking x 10         Manual Therapy  Manual Therapy Activity 1: MFR and CFM to R ant hip, scar tissue mobs, iliopsoas and ADD release,  Manual Therapy Activity 2: stretch R hip to neutral extension, ADDs  Manual Therapy Activity 3: gentle inf hip mobs    Therapeutic Activity  Therapeutic Activity 1: tilt board M/L controlled taps and balance x 1 min ea  Therapeutic Activity 2: tilt board A/P controlled taps and balance x 1 min ea    Modalities  Modalities Used: Yes  Modality 1: Untimed Cold Pack (R " hip x 15' - bell)           OP EDUCATION:  Outpatient Education  Education Comment: Access Code: [email or web address]  URL: [email or web address]  Date: 03/11/2024  Prepared by: Sara Mansfield    Exercises  - Standing Hip Hiking  - 1 x daily - 7 x weekly - 3 sets - 10 reps    Assessment:  PT Assessment  Assessment Comment: Patient continues to ambulate with antalgic gait.  R LE appears longer in stance. Relief with soft tissue work and CP. Added hip hiking to HEP.    Plan:  OP PT Plan  PT Plan: Skilled PT (Progress strengthening, range of motion and functional tasks for performance of ADLs., PT TO STRETCH AND MASSAGE FOR THE NEXT COUPLE OF DAYS AND REST FROM STRENGTHENING EX'S)  Onset Date: 01/02/24  Number of Treatments Authorized: 10 of 30  Rehab Potential: Good  Plan of Care Agreement: Patient    Goals:  Active       PT Problem       STG       Start:  02/01/24    Expected End:  02/27/24       1) Patient will improve LEFS score by 9 points in order to perform functional activities at home and in the community.  2) Patient will be able to complete ADLs with pain less than 4/10 in hip.  3) Pt will improve hip flexion ROM by 100 degrees to be able to complete ADLs with less difficult.  4) Patient will be independent with HEP to allow for continued improvement in daily tasks at home and in the community in 3 visits.          LTG       Start:  02/01/24    Expected End:  03/28/24       1) Patient will have 5/5 strength in lateral hip musculature to aid in stability with ambulation on varied surfaces in community.  2) Patient will be able to perform proper squatting technique in order to prevent increased pain with daily tasks.  3) Patient will be able to perform >30 seconds of SLS on even ground in order to allow for safe ambulation and reduced fall risk within the community.  4) Patient will improve LEFS score by to >/=70/80 in order to perform functional activities at home and in the community by discharge.               Karina Mansfield, PTA

## 2024-03-14 ENCOUNTER — TREATMENT (OUTPATIENT)
Dept: PHYSICAL THERAPY | Facility: CLINIC | Age: 59
End: 2024-03-14
Payer: COMMERCIAL

## 2024-03-14 DIAGNOSIS — M25.551 RIGHT HIP PAIN: ICD-10-CM

## 2024-03-14 DIAGNOSIS — Z96.641 PRESENCE OF RIGHT ARTIFICIAL HIP JOINT: ICD-10-CM

## 2024-03-14 PROCEDURE — 97140 MANUAL THERAPY 1/> REGIONS: CPT | Mod: GP,CQ

## 2024-03-14 PROCEDURE — 97112 NEUROMUSCULAR REEDUCATION: CPT | Mod: GP,CQ

## 2024-03-14 PROCEDURE — 97010 HOT OR COLD PACKS THERAPY: CPT | Mod: GP,CQ

## 2024-03-14 PROCEDURE — 97110 THERAPEUTIC EXERCISES: CPT | Mod: GP,CQ

## 2024-03-14 ASSESSMENT — PAIN SCALES - GENERAL: PAINLEVEL_OUTOF10: 2

## 2024-03-14 ASSESSMENT — PAIN - FUNCTIONAL ASSESSMENT: PAIN_FUNCTIONAL_ASSESSMENT: 0-10

## 2024-03-14 NOTE — PROGRESS NOTES
Physical Therapy Treatment    Patient Name: Trupti Medrano  MRN: [id number]  Today's Date: 3/14/2024  Time Calculation  Start Time: 1606  Stop Time: 1702  Time Calculation (min): 56 min  PT Therapeutic Procedures Time Entry  Manual Therapy Time Entry: 18  Neuromuscular Re-Education Time Entry: 4  Therapeutic Exercise Time Entry: 19, PT Modalities Time Entry  Hot/Cold Pack Time Entry: 15    Current Problem  1. Right hip pain  Follow Up In Physical Therapy      2. Presence of right artificial hip joint  Follow Up In Physical Therapy            Insurance:  Payor: [facility] / Plan: [facility] / Product Type: *No Product type* /   Number of Treatments Authorized: 11 of 30          Subjective   General  Reason for Referral: s/p R JAMEEL (DOS: 1/2/2024)  Referred By: Dr. Lloyd Pride  Past Medical History Relevant to Rehab: Asthma, Possible HTN (reviewed medical health history - RLJORDIN, EDI BRANNON)  General Comment: PT STATES SHE SAW MD YESTERDAY AND THE HIP JT ITSELF LOOKS GOOD.  HE WANTS MORE STRETCHING OF QUAD, HIP FLEX.  HE WANTS TO STOP HIP FLEX STRENGTHENING EX'S FOR NOW.  HER HIP ONLY HURTS WHEN SHE ISN'T LEVEL WHEN STANDING.  L HIP HURTS.  ALSO MD WANTED HER TO CANE STILL.    Performing HEP?: Yes    Precautions  Precautions  Post-Surgical Precautions: Right hip precautions (Anterior Approach)  Precautions Comment: None  Pain  Pain Assessment: 0-10  Pain Score: 2  Pain Location: Hip  Pain Orientation: Right, Anterior    Objective   General Observation  General Observation: PT AMB WITH CANE AND ANTALGIC GAIT       Treatments:  Therapeutic Exercise  Therapeutic Exercise Activity 1: SciFit, MAN L3 X 6 MIN  Therapeutic Exercise Activity 2: GASTROC STRETCH X 1 MIN  Therapeutic Exercise Activity 3: HEEL RAISES X 1 MIN  Therapeutic Exercise Activity 4: FOOTWORM YELLOW LOOP ALONG // BAR X 2 MIN  Therapeutic Exercise Activity 5: R KNEE ON STOOL HIP FLEX STRETCH X 2 MIN  Therapeutic Exercise  Activity 6: TG L7 SQUATS X 2 MIN  Therapeutic Exercise Activity 7: BRIDGE WITH HIP ABD GREEN LOOP X 2 MIN  Therapeutic Exercise Activity 8: FALLON x 2 min    Balance/Neuromuscular Re-Education  Balance/Neuromuscular Re-Education Activity 1: TILT BOARD BALANCING F/B, S/S X 2 MIN EACH    Manual Therapy  Manual Therapy Activity 1: STM R QUAD, IT BAND, HIP FLEX, SCARS  Manual Therapy Activity 2: PROM R HIP FLEX  Manual Therapy Activity 3: STRETCH R HAM, QUAD, HIP FLEX         Modalities  Modalities Used: Yes  Modality 1: Untimed Cold Pack (R hip x 15' - bell)         OP EDUCATION:  Outpatient Education  Education Comment: CONTINUE WITH CURRENT HEP.    Assessment:  PT Assessment  Assessment Comment: PT MARITZA EX'S WELL. NO C/O PAIN TODAY IN HER R HIP.  SHE CONTINUES TO BE TIGHT IN HER QUAD AND SCAR.  PT FELT SHE WAS ABLE TO WALK BETTER AFTER TODAY'S SESSION AND HAD LESS PAIN.    Plan:  OP PT Plan  PT Plan: Skilled PT (Progress strengthening, range of motion and functional tasks for performance of ADLs., PT TO STRETCH AND MASSAGE FOR THE NEXT COUPLE OF DAYS AND REST FROM STRENGTHENING EX'S)  Onset Date: 01/02/24  Number of Treatments Authorized: 11 of 30  Rehab Potential: Good  Plan of Care Agreement: Patient    Goals:  Active       PT Problem       STG       Start:  02/01/24    Expected End:  02/27/24       1) Patient will improve LEFS score by 9 points in order to perform functional activities at home and in the community.  2) Patient will be able to complete ADLs with pain less than 4/10 in hip.  3) Pt will improve hip flexion ROM by 100 degrees to be able to complete ADLs with less difficult.  4) Patient will be independent with HEP to allow for continued improvement in daily tasks at home and in the community in 3 visits.          LTG       Start:  02/01/24    Expected End:  03/28/24       1) Patient will have 5/5 strength in lateral hip musculature to aid in stability with ambulation on varied surfaces in community.  2)  Patient will be able to perform proper squatting technique in order to prevent increased pain with daily tasks.  3) Patient will be able to perform >30 seconds of SLS on even ground in order to allow for safe ambulation and reduced fall risk within the community.  4) Patient will improve LEFS score by to >/=70/80 in order to perform functional activities at home and in the community by discharge.              Scooter Aquino, PTA

## 2024-03-18 ENCOUNTER — OFFICE VISIT (OUTPATIENT)
Dept: PRIMARY CARE | Facility: CLINIC | Age: 59
End: 2024-03-18
Payer: COMMERCIAL

## 2024-03-18 VITALS
SYSTOLIC BLOOD PRESSURE: 132 MMHG | WEIGHT: 194.8 LBS | DIASTOLIC BLOOD PRESSURE: 82 MMHG | BODY MASS INDEX: 34.51 KG/M2 | HEART RATE: 71 BPM | OXYGEN SATURATION: 98 %

## 2024-03-18 DIAGNOSIS — E55.9 VITAMIN D DEFICIENCY: Primary | ICD-10-CM

## 2024-03-18 PROCEDURE — 99213 OFFICE O/P EST LOW 20 MIN: CPT | Performed by: PHYSICIAN ASSISTANT

## 2024-03-18 PROCEDURE — 1036F TOBACCO NON-USER: CPT | Performed by: PHYSICIAN ASSISTANT

## 2024-03-18 ASSESSMENT — PAIN SCALES - GENERAL: PAINLEVEL: 2

## 2024-03-18 ASSESSMENT — COLUMBIA-SUICIDE SEVERITY RATING SCALE - C-SSRS
1. IN THE PAST MONTH, HAVE YOU WISHED YOU WERE DEAD OR WISHED YOU COULD GO TO SLEEP AND NOT WAKE UP?: NO
2. HAVE YOU ACTUALLY HAD ANY THOUGHTS OF KILLING YOURSELF?: NO
6. HAVE YOU EVER DONE ANYTHING, STARTED TO DO ANYTHING, OR PREPARED TO DO ANYTHING TO END YOUR LIFE?: NO

## 2024-03-18 ASSESSMENT — ENCOUNTER SYMPTOMS
FATIGUE: 0
SHORTNESS OF BREATH: 0
FEVER: 0
ABDOMINAL PAIN: 0

## 2024-03-18 NOTE — LETTER
March 18, 2024                      Patient: Trupti Medrano   YOB: 1965   Date of Visit: 3/18/2024       To Whom It May Concern:    Trupti Medrano was seen in the office for surgical clearance on 03/18/2024, and is medically cleared for her procedure. If you have any questions or concerns, please contact the office.         Sincerely,          Bridget Carpio PA-C

## 2024-03-18 NOTE — PROGRESS NOTES
Trupti Medrano is a 58 y.o. female that is presenting today for surgical clearance.    Subjective   HPI  pre-operative surgical clearance:              Surgery planned...L JAMEEL              Date of surgery...4/2/23              A letter requesting clearance has been recieved by ....  Dr. Solorio ([facility])              The patients past medical history is absent for CAD, MI, CVA, CHF, DM, CKD.              Patients functional capacity is rated at > or = to 4 mets based on the patients reported activites of...routine exercise              Recent cardiac stress or re-vascularization - no              Prior complications to anesthesia or surgery - has woken up during procedure    Review of Systems   Constitutional:  Negative for fatigue and fever.   Respiratory:  Negative for shortness of breath.    Cardiovascular:  Negative for chest pain.   Gastrointestinal:  Negative for abdominal pain.   Skin:  Negative for rash.       Objective   Vitals:    03/18/24 1311   BP: 132/82   Pulse: 71   SpO2: 98%        Physical Exam  Constitutional:       Appearance: Normal appearance.   HENT:      Head: Normocephalic and atraumatic.   Eyes:      Extraocular Movements: Extraocular movements intact.      Conjunctiva/sclera: Conjunctivae normal.   Cardiovascular:      Rate and Rhythm: Normal rate and regular rhythm.      Heart sounds: Normal heart sounds.   Pulmonary:      Effort: Pulmonary effort is normal.      Breath sounds: Normal breath sounds. No wheezing or rhonchi.   Musculoskeletal:      Cervical back: Normal range of motion and neck supple.   Skin:     General: Skin is warm.      Findings: No rash.   Neurological:      General: No focal deficit present.      Mental Status: She is alert.         Assessment/Plan    Diagnoses and all orders for this visit:  Vitamin D deficiency  -     Vitamin D 25-Hydroxy,Total (for eval of Vitamin D levels); Future    Preoperative clearance [Z01.818]       RISK ASSESSMENT TOOLS   1)  The ACC/AHA classification of surgical risk:  MODERATE ortho             2) Carito Simple Cardiac Risk Index: 1 point for each             A) High risk surgery - 0             B) CAD - 0             C) h/o CVA - 0             D) CHF - 0             E) insulin dependant DM - 0             F) Cr > 2.0 - 0             Score interpretation/percent risk of complication (ie. MI,PE,Vfib,cardiac arrest,heart block):             0 points = class I (0.4%)  3) ACC/AHA algorithm:             Cardiac Risk Low- if Avery's class I and no predictors of CV diseases. >4 mets functional capacity then cleared for surgery    Surgical clearance letter printed + signed, faxed to [facility]

## 2024-03-20 ENCOUNTER — TREATMENT (OUTPATIENT)
Dept: PHYSICAL THERAPY | Facility: CLINIC | Age: 59
End: 2024-03-20
Payer: COMMERCIAL

## 2024-03-20 DIAGNOSIS — M25.551 RIGHT HIP PAIN: Primary | ICD-10-CM

## 2024-03-20 DIAGNOSIS — Z96.641 PRESENCE OF RIGHT ARTIFICIAL HIP JOINT: ICD-10-CM

## 2024-03-20 PROCEDURE — 97140 MANUAL THERAPY 1/> REGIONS: CPT | Mod: GP,CQ

## 2024-03-20 PROCEDURE — 97110 THERAPEUTIC EXERCISES: CPT | Mod: GP,CQ

## 2024-03-20 ASSESSMENT — PAIN - FUNCTIONAL ASSESSMENT: PAIN_FUNCTIONAL_ASSESSMENT: 0-10

## 2024-03-20 ASSESSMENT — PAIN SCALES - GENERAL: PAINLEVEL_OUTOF10: 2

## 2024-03-20 NOTE — PROGRESS NOTES
"  Physical Therapy Treatment    Patient Name: Trupti Medrano  MRN: [id number]  Today's Date: 3/20/2024  Time Calculation  Start Time: 1232   ,      Current Problem  1. Right hip pain  Follow Up In Physical Therapy      2. Presence of right artificial hip joint  Follow Up In Physical Therapy          Insurance:  Number of Treatments Authorized: 12 of 30            Subjective   General  Reason for Referral: s/p R JAMEEL (DOS: 1/2/2024)  Referred By: Dr. Lloyd Pride  Past Medical History Relevant to Rehab: Asthma, Possible HTN (reviewed medical health history - RLM, EDI BRANNON, ERNST)  General Comment: Pt states that L Hip is more painful than right today, she rates it at a 4/10. Pt is currently scheduled for JAMEEL 4/2/2024.  Would like to review current HEP due to this being her last visit.    Performing HEP?: Yes    Precautions  Precautions  Post-Surgical Precautions: Right hip precautions (Anterior Approach)  Precautions Comment: None  Pain  Pain Assessment: 0-10  Pain Score: 2  Pain Orientation: Right    Objective   R LE SLS 28 sec w/o UE  R hip PROM *  LEFS 46      Treatments:    Therapeutic Exercise  Therapeutic Exercise Activity 1: SciFit, MAN L3 X 6 MIN  Therapeutic Exercise Activity 2: GASTROC STRETCH X 1 MIN  Therapeutic Exercise Activity 3: HEEL RAISES X 1 MIN  Therapeutic Exercise Activity 4: TG L7 Squats BLE 10 x 3  Therapeutic Exercise Activity 5: TG L4 Squats S/L RLE 10 x 3  Therapeutic Exercise Activity 6: FOOTWORM YELLOW LOOP 40' x 2  Therapeutic Exercise Activity 7: Sit to Stands with 10# KB x 5 - increased pain L  Therapeutic Exercise Activity 8: R Eccentric Lowering off step x 1 MIN  Therapeutic Exercise Activity 9: Bridging with ball squeeze 3\" hold 10 x 2  Therapeutic Exercise Activity 10: L SL R Hip Abd 10 x 2  Therapeutic Exercise Activity 11: L SL Clam Shells  5x1 - increased pain  Therapeutic Exercise Activity 12: R Supine HF stretch x  1 MIN    Balance/Neuromuscular " Re-Education  Balance/Neuromuscular Re-Education Activity 1: SLS    Manual Therapy  Manual Therapy Activity 1: STM R QUAD, IT BAND, HIP FLEX, SCARS  Manual Therapy Activity 2: gentle R hip flex stretch         Modalities  Modality 1: Untimed Cold Pack (R hip x 15' - bell)           OP EDUCATION:  Outpatient Education  Education Comment: Continue with reviewed HEP    Assessment:  PT Assessment  Assessment Comment: Patient has made great progress with R JAMEEL, evidenced by improved LEFS, hip ROM and SLS time.  Reviewed and updated her current HEP to focus on at this time.  Patient is scheduled for L JAMEEL 4/2/24.    Plan:  OP PT Plan  PT Plan: Skilled PT (Progress strengthening, range of motion and functional tasks for performance of ADLs., PT TO STRETCH AND MASSAGE FOR THE NEXT COUPLE OF DAYS AND REST FROM STRENGTHENING EX'S)  Onset Date: 01/02/24  Number of Treatments Authorized: 12 of 30  Rehab Potential: Good  Plan of Care Agreement: Patient    Goals:  Active       PT Problem       STG       Start:  02/01/24    Expected End:  02/27/24       1) Patient will improve LEFS score by 9 points in order to perform functional activities at home and in the community.  2) Patient will be able to complete ADLs with pain less than 4/10 in hip.  3) Pt will improve hip flexion ROM by 100 degrees to be able to complete ADLs with less difficult.  4) Patient will be independent with HEP to allow for continued improvement in daily tasks at home and in the community in 3 visits.          LTG       Start:  02/01/24    Expected End:  03/28/24       1) Patient will have 5/5 strength in lateral hip musculature to aid in stability with ambulation on varied surfaces in community.  2) Patient will be able to perform proper squatting technique in order to prevent increased pain with daily tasks.  3) Patient will be able to perform >30 seconds of SLS on even ground in order to allow for safe ambulation and reduced fall risk within the  community.  4) Patient will improve LEFS score by to >/=70/80 in order to perform functional activities at home and in the community by discharge.              PIPPA SONG, S-PTA

## 2024-03-27 ENCOUNTER — APPOINTMENT (OUTPATIENT)
Dept: PHYSICAL THERAPY | Facility: CLINIC | Age: 59
End: 2024-03-27
Payer: COMMERCIAL

## 2024-03-28 DIAGNOSIS — J45.20 MILD INTERMITTENT ASTHMA WITHOUT COMPLICATION (HHS-HCC): ICD-10-CM

## 2024-03-28 RX ORDER — ALBUTEROL SULFATE 90 UG/1
2 AEROSOL, METERED RESPIRATORY (INHALATION) EVERY 6 HOURS PRN
Qty: 18 G | Refills: 1 | Status: CANCELLED | OUTPATIENT
Start: 2024-03-28

## 2024-03-28 RX ORDER — ALBUTEROL SULFATE 90 UG/1
2 AEROSOL, METERED RESPIRATORY (INHALATION) EVERY 6 HOURS PRN
Qty: 18 G | Refills: 1 | Status: SHIPPED | OUTPATIENT
Start: 2024-03-28 | End: 2024-05-17 | Stop reason: WASHOUT

## 2024-03-28 NOTE — TELEPHONE ENCOUNTER
Pt called requesting a refill on inhaler. Pt also would like to know if she could be prescribed something for her anxiety for her upcoming surgery on Tuesday. Pt states that she forgot to mentioned it during her appt. Pt last seen on 3/18/2024.

## 2024-04-03 ENCOUNTER — APPOINTMENT (OUTPATIENT)
Dept: PHYSICAL THERAPY | Facility: CLINIC | Age: 59
End: 2024-04-03
Payer: COMMERCIAL

## 2024-04-15 ENCOUNTER — APPOINTMENT (OUTPATIENT)
Dept: PRIMARY CARE | Facility: CLINIC | Age: 59
End: 2024-04-15
Payer: COMMERCIAL

## 2024-04-26 ENCOUNTER — EVALUATION (OUTPATIENT)
Dept: PHYSICAL THERAPY | Facility: CLINIC | Age: 59
End: 2024-04-26
Payer: COMMERCIAL

## 2024-04-26 DIAGNOSIS — M25.552 LEFT HIP PAIN: Primary | ICD-10-CM

## 2024-04-26 DIAGNOSIS — Z96.642 PRESENCE OF LEFT ARTIFICIAL HIP JOINT: ICD-10-CM

## 2024-04-26 PROCEDURE — 97116 GAIT TRAINING THERAPY: CPT | Mod: GP | Performed by: PHYSICAL THERAPIST

## 2024-04-26 PROCEDURE — 97162 PT EVAL MOD COMPLEX 30 MIN: CPT | Mod: GP | Performed by: PHYSICAL THERAPIST

## 2024-04-26 PROCEDURE — 97110 THERAPEUTIC EXERCISES: CPT | Mod: GP | Performed by: PHYSICAL THERAPIST

## 2024-04-26 ASSESSMENT — ENCOUNTER SYMPTOMS
OCCASIONAL FEELINGS OF UNSTEADINESS: 1
DEPRESSION: 0
LOSS OF SENSATION IN FEET: 0

## 2024-04-26 ASSESSMENT — PAIN SCALES - GENERAL: PAINLEVEL_OUTOF10: 3

## 2024-04-26 ASSESSMENT — PATIENT HEALTH QUESTIONNAIRE - PHQ9
2. FEELING DOWN, DEPRESSED OR HOPELESS: NOT AT ALL
SUM OF ALL RESPONSES TO PHQ9 QUESTIONS 1 AND 2: 0
1. LITTLE INTEREST OR PLEASURE IN DOING THINGS: NOT AT ALL

## 2024-04-26 ASSESSMENT — ACTIVITIES OF DAILY LIVING (ADL): ADL_ASSISTANCE: INDEPENDENT

## 2024-04-26 ASSESSMENT — PAIN - FUNCTIONAL ASSESSMENT: PAIN_FUNCTIONAL_ASSESSMENT: 0-10

## 2024-04-26 NOTE — PROGRESS NOTES
Physical Therapy  Physical Therapy Orthopedic Evaluation      Patient Name: Trupti Medrano  MRN: [id number]  Today's Date: 4/26/2024  Time Calculation  Start Time: 1005  Stop Time: 1050  Time Calculation (min): 45 min  PT Evaluation Time Entry  PT Evaluation (Moderate) Time Entry: 16  PT Therapeutic Procedures Time Entry  Therapeutic Exercise Time Entry: 15  Gait Training Time Entry: 10       Insurance:    Number of Treatments Authorized: 13 of 30 (12v used previous surgery) (Auth after 30)        Insurance Type: Payor: [facility] / Plan: [facility] / Product Type: *No Product type* /     Current Problem  1. Left hip pain  Follow Up In Physical Therapy      2. Presence of left artificial hip joint  Follow Up In Physical Therapy          General:  Reason for Referral: s/p L JAMEEL (DOS: 4/2/2024)  Referred By: Dr. Lloyd Pride    Past Medical History  Past Medical History Relevant to Rehab: R JAMEEL 1/2/2024, Asthma, Possible HTN (reviewed medical health history - DICK, ERNST SOLOMON)    Precautions:   Precautions  STEADI Fall Risk Score (The score of 4 or more indicates an increased risk of falling): 6  Post-Surgical Precautions: Left hip precautions (Anterior Approach)  Precautions Comment: Moderate    Medical History Form: Reviewed (scanned into chart)    Subjective:   Subjective   General Comment: Patient presents s/p L JAMEEL following long-standing hip OA. She had a previous R JAMEEL earlier this year and has recovered well from it. She is currently getting assistance    Onset Date: Onset Date: 04/02/24    Current Condition:   Better    Prior Functional Level: Prior Function Per Pt/Caregiver Report  Level of [place]: Independent with ADLs and functional transfers  ADL Assistance: Independent  Homemaking Assistance: Needs assistance  Vacuuming: Other (Comment) (Unable to perform)  Cleaning: Other (Comment) (Difficulty with doing all of cleaning during positioning and difficulty  "with ADL's)  Driving/Transportation: Family/Friend  Vocational: Unemployed    Pain:  Pain Assessment: 0-10  Pain Score: 3  Pain Location: Hip (#1 (C,V): L anterior hip/thigh, 2-7/10, \"burning/pulling/sharp\")  Effect of Pain on Daily Activities: Limited with walking, standing and    Previous Interventions/Treatments/Previous Tests & Imaging: Physical Therapy Comment: Medical Management: Home PT x4 visits, Pain Medication    Patients Living Environment: Home Living Comment: Multi-story condo    Primary Language: English    Patient's Goal(s) for Therapy: Able to progress strength, ROM and functional tasks to return to ADL's at [facility]    Red Flags: Do you have any of the following?         Red Flags: None    Objective:  Objective   HIP    Functional Rating Scale     Observation  Observation Comment: Slow guarded movement in all planes;  Hip Palpation/Joint Mobility   Palpation / Joint Mobility Comment: Tenderness and tightness noted with palpation R anterior hip/thigh    Hip AROM  R hip flexion: (125°): 105° (#1)  L hip flexion: (125°): 75°  R hip abduction: (45°): 40°  L hip abduction: (45°): 20°  R hip ER: (45°): 38°  L hip ER: (45°): 15°  R hip IR: (45°): 25°  L hip IR: (45°): 10°  Hip PROM  R hip flexion: (125°): 78° (#1)  R hip abduction: (45°): 25° (#1)  Specific Lower Extremity MMT  R Iliopsoas: (5/5): 4+/5  L Iliopsoas: (5/5): 4-/5  R Gluteals (prone): (5/5): 4+/5 (tested in supine)  L Gluteals (prone): (5/5): 4-/5 (tested in supine)  R Gluteals (sidelying): (5/5): 4+/5 (tested in supine)  L Gluteals (sidelying): (5/5): 3+/5 (#1 limited test (tested in supine))  R knee flexion: (5/5): 5/5  L knee flexion: (5/5): 5/5  R knee extension: (5/5): 5/5  L knee extension: (5/5): 5/5    Gait  Gait Comment: Ambulation with rolling walker with decreased stance time, stride length and pace    Outcome Measures:  Other Measures  Lower Extremity Funtional Score (LEFS): 22/80 (27.5%)     Treatment Performed:  Therapeutic " Exercise  Therapeutic Exercise Activity 1: PROM L hip all planes per patient tolerance  Therapeutic Exercise Activity 2: SciFit, Man L1 x 5 mins           Outpatient Education  Individual(s) Educated: Patient  Education Provided: Home Exercise Program  Patient/Caregiver Demonstrated Understanding: yes  Education Comment: Review of current form and technique with exercises prescribed by home health PT    Assessment:   Patient is 58 y.o. year old who presents to physical therapy with signs and symptoms consistent with s/p left JAMEEL. Patient has decreased ROM and strength limiting functional mobility and ADLs. Patient would benefit from skilled physical therapy in order to address the stated deficits and return to daily tasks with reduced pain and improved function. She has had home therapy and progressed with tolerance to activity. She has a fear of re-injury or injury to the site of surgery which could impact overall recovery process but she is motivated and compliant which will positively impact prognosis.    Personal Factors Affecting Care:   Transportation    SINSS:  Severity: Moderate  Irritability: Moderate  Nature: MSK L hip  Stage: Sub-Acute  Stability: Stable and/or uncomplicated characteristics    Rehab Prognosis: Good      Plan:  Treatment/Interventions: Electrical stimulation, Education/ Instruction, Dry needling, Neuromuscular re-education, Self care/ home management, Therapeutic activities, Therapeutic exercises, Manual therapy  PT Plan: Skilled PT  PT Frequency: 2 times per week  Duration: 6 weeks  Onset Date: 04/02/24  Rehab Potential: Good    Goals: Set and discussed today  STG (Expected End 5/14/2024)  1) Patient will improve LEFS score by 9 points in order to perform functional activities at home and in the community.  2) Patient will be able to complete ADLs with pain less than 5/10 in hip.  3) Pt will improve hip flexion ROM by 15 degrees to be able to complete ADLs with less difficult.  4) Patient  will be independent with HEP to allow for continued improvement in daily tasks at home and in the community in 3 visits.     LTG (Expected End 6/5/2024)  1) Patient will have 4+/5 strength in lateral hip musculature to aid in stability with ambulation on varied surfaces in community.  2) Patient will be able to perform proper squatting technique in order to prevent increased pain with daily tasks.  3) Patient will be able to perform >30 seconds of SLS on even ground in order to allow for safe ambulation and reduced fall risk within the community.  4) Patient will improve LEFS score by to >/=70/80 in order to perform functional activities at home and in the community by discharge.    Plan of care was developed with input and agreement by the patient        Yonatan Joshi PT      This note was dictated with voice recognition software. It has not been proofread for grammatical errors, typographical mistakes or other semantic inconsistencies.

## 2024-04-30 ENCOUNTER — APPOINTMENT (OUTPATIENT)
Dept: PHYSICAL THERAPY | Facility: CLINIC | Age: 59
End: 2024-04-30
Payer: COMMERCIAL

## 2024-05-02 ENCOUNTER — TREATMENT (OUTPATIENT)
Dept: PHYSICAL THERAPY | Facility: CLINIC | Age: 59
End: 2024-05-02
Payer: COMMERCIAL

## 2024-05-02 DIAGNOSIS — Z96.642 PRESENCE OF LEFT ARTIFICIAL HIP JOINT: ICD-10-CM

## 2024-05-02 DIAGNOSIS — M25.552 LEFT HIP PAIN: Primary | ICD-10-CM

## 2024-05-02 PROCEDURE — 97140 MANUAL THERAPY 1/> REGIONS: CPT | Mod: GP,CQ

## 2024-05-02 PROCEDURE — 97110 THERAPEUTIC EXERCISES: CPT | Mod: GP,CQ

## 2024-05-02 ASSESSMENT — PAIN - FUNCTIONAL ASSESSMENT: PAIN_FUNCTIONAL_ASSESSMENT: 0-10

## 2024-05-02 ASSESSMENT — PAIN SCALES - GENERAL: PAINLEVEL_OUTOF10: 2

## 2024-05-02 NOTE — PROGRESS NOTES
"  Physical Therapy Treatment    Patient Name: Trupti Medrano  MRN: [id number]  Today's Date: 5/2/2024  Time Calculation  Start Time: 1146  Stop Time: 1230  Time Calculation (min): 44 min   ,      Current Problem  1. Left hip pain        2. Presence of left artificial hip joint            Insurance:  Number of Treatments Authorized: 14 of 30 (12v used previous surgery) (Auth after 30)            Subjective   General  Reason for Referral: s/p L JAMEEL (DOS: 4/2/2024)  Referred By: Dr. Lloyd Pride  Past Medical History Relevant to Rehab: R JAMEEL 1/2/2024, Asthma, Possible HTN (reviewed medical health history - RLJORDIN, ERNST SOLOMON)  General Comment: Patient reports feeling good today but having some pain in her buttocks when sitting down. Starting using a cane last week in place of a walker and feels stable with it. Having some medial knee pain with squats and with active knee extension.    Performing HEP?: Yes    Precautions  Precautions  STEADI Fall Risk Score (The score of 4 or more indicates an increased risk of falling): 6  Post-Surgical Precautions: Left hip precautions (Anterior Approach)  Precautions Comment: Moderate  Pain  Pain Assessment: 0-10  Pain Score: 2  Pain Location: Hip  Pain Orientation: Left    Objective     Came into clinic with SPC    Treatments:    Therapeutic Exercise  Therapeutic Exercise Activity 1: SciFit, Man L1 x 5 mins  Therapeutic Exercise Activity 2: incline g/s stretch x 1 MIN  Therapeutic Exercise Activity 3: Heel raises x 1 min  Therapeutic Exercise Activity 4: Toe raises x 1 MIN  Therapeutic Exercise Activity 5: Hip Abduction R/L 2 x 10 each  Therapeutic Exercise Activity 6: mini squats with tap on chair with 2 pads 3 x 10  Therapeutic Exercise Activity 7: Seated L LAQ 2 x 10  Therapeutic Exercise Activity 8: L Lateral step ups onto 4 in step x 10  Therapeutic Exercise Activity 9: supine with L leg straight - R SKTC for L ant hip stretch 2 x 30\"    Balance/Neuromuscular " Re-Education  Balance/Neuromuscular Re-Education Activity 1: Weight shifts R/L x 2 MIN    Manual Therapy  Manual Therapy Activity 1: STM L Quad, Hip Abductors  Manual Therapy Activity 2: LLE PROM                   Participated in performance of tx and documentation under the direct supervision of CI, student Kriss BARBA       OP EDUCATION:  Outpatient Education  Education Comment: Advised patient to stop performing hip extension exercises due to precautions.    Assessment:  PT Assessment  Rehab Prognosis: Good  Assessment Comment: Pt tolerated treatment well today with no increased pain by the end of the visit. Challenged today with squatting exercises and active knee extension. Overall progressing towards goals.    Plan:  OP PT Plan  Treatment/Interventions: Electrical stimulation, Education/ Instruction, Dry needling, Neuromuscular re-education, Self care/ home management, Therapeutic activities, Therapeutic exercises, Manual therapy  PT Plan: Skilled PT  PT Frequency: 2 times per week  Duration: 6 weeks  Onset Date: 04/02/24  Number of Treatments Authorized: 14 of 30 (12v used previous surgery) (Auth after 30)  Rehab Potential: Good  Plan of Care Agreement: Patient    Goals:  Physical Therapy - R hip, 2/2024 Problems       Physical Therapy - R hip, 2/2024 Problems (Resolved)       PT Problem       STG (Adequate for Discharge)       Start:  02/01/24    Expected End:  02/27/24    Resolved:  04/26/24    1) Patient will improve LEFS score by 9 points in order to perform functional activities at home and in the community.  2) Patient will be able to complete ADLs with pain less than 4/10 in hip.  3) Pt will improve hip flexion ROM by 100 degrees to be able to complete ADLs with less difficult.  4) Patient will be independent with HEP to allow for continued improvement in daily tasks at home and in the community in 3 visits.          LTG (Adequate for Discharge)       Start:  02/01/24    Expected End:  03/28/24     Resolved:  04/26/24    1) Patient will have 5/5 strength in lateral hip musculature to aid in stability with ambulation on varied surfaces in community.  2) Patient will be able to perform proper squatting technique in order to prevent increased pain with daily tasks.  3) Patient will be able to perform >30 seconds of SLS on even ground in order to allow for safe ambulation and reduced fall risk within the community.  4) Patient will improve LEFS score by to >/=70/80 in order to perform functional activities at home and in the community by discharge.              Physical Therapy - L hip, 4/2024 Problems       Physical Therapy - L hip, 4/2024 Problems (Active)       PT Problem       STG       Start:  04/26/24    Expected End:  05/14/24       1) Patient will improve LEFS score by 9 points in order to perform functional activities at home and in the community.  2) Patient will be able to complete ADLs with pain less than 5/10 in hip.  3) Pt will improve hip flexion ROM by 15 degrees to be able to complete ADLs with less difficult.  4) Patient will be independent with HEP to allow for continued improvement in daily tasks at home and in the community in 3 visits.          LTG       Start:  04/26/24    Expected End:  06/05/24       1) Patient will have 4+/5 strength in lateral hip musculature to aid in stability with ambulation on varied surfaces in community.  2) Patient will be able to perform proper squatting technique in order to prevent increased pain with daily tasks.  3) Patient will be able to perform >30 seconds of SLS on even ground in order to allow for safe ambulation and reduced fall risk within the community.  4) Patient will improve LEFS score by to >/=70/80 in order to perform functional activities at home and in the community by discharge.                 Karina Mansfield, PTA

## 2024-05-07 ENCOUNTER — TREATMENT (OUTPATIENT)
Dept: PHYSICAL THERAPY | Facility: CLINIC | Age: 59
End: 2024-05-07
Payer: COMMERCIAL

## 2024-05-07 DIAGNOSIS — M25.552 LEFT HIP PAIN: Primary | ICD-10-CM

## 2024-05-07 DIAGNOSIS — Z96.642 PRESENCE OF LEFT ARTIFICIAL HIP JOINT: ICD-10-CM

## 2024-05-07 PROCEDURE — 97110 THERAPEUTIC EXERCISES: CPT | Mod: GP,CQ

## 2024-05-07 PROCEDURE — 97140 MANUAL THERAPY 1/> REGIONS: CPT | Mod: GP,CQ

## 2024-05-07 ASSESSMENT — PAIN SCALES - GENERAL: PAINLEVEL_OUTOF10: 0 - NO PAIN

## 2024-05-07 ASSESSMENT — PAIN - FUNCTIONAL ASSESSMENT: PAIN_FUNCTIONAL_ASSESSMENT: 0-10

## 2024-05-07 NOTE — PROGRESS NOTES
Physical Therapy Treatment    Patient Name: Trupti Medrano  MRN: [id number]  Today's Date: 5/7/2024  Time Calculation  Start Time: 1230  Stop Time: 1332  Time Calculation (min): 62 min   ,      Current Problem  1. Left hip pain        2. Presence of left artificial hip joint            Insurance:  Number of Treatments Authorized: 15 of 30 (12v used previous surgery) (Auth after 30)            Subjective   General  Reason for Referral: s/p L JAMEEL (DOS: 4/2/2024)  Referred By: Dr. Lloyd Pride  Past Medical History Relevant to Rehab: R JAMEEL 1/2/2024, Asthma, Possible HTN (reviewed medical health history - RLJORDIN, ERNST SOLOMON)  General Comment: Patient notes she took an extended walk on Sunday and she got a sharp pain L and hip.  She notes she thinks she broke her toe on her R foot.  Patient reports she has previous LB disc issues and L LE rad sx may be contributing to her sx.    Performing HEP?: Yes    Precautions  Precautions  STEADI Fall Risk Score (The score of 4 or more indicates an increased risk of falling): 6  Post-Surgical Precautions: Left hip precautions (Anterior Approach)  Precautions Comment: Moderate  Pain  Pain Assessment: 0-10  Pain Score: 0 - No pain (intermittent sharp zingers down her leg)  Pain Location: Hip  Pain Orientation: Left    Objective       Treatments:    Therapeutic Exercise  Therapeutic Exercise Activity 1: SciFit Man L2 x 6 min  Therapeutic Exercise Activity 2: incline g/s stretch x 1 MIN  Therapeutic Exercise Activity 3: sitting with lumbar roll  x 2 min  Therapeutic Exercise Activity 4: alt foot tap on edge of TDM x 1 min  Therapeutic Exercise Activity 5: lateral walking R/L x 60' ea  Therapeutic Exercise Activity 6: lateral step up on airex pad 2 x 10  Therapeutic Exercise Activity 7: L foot on BOSU mini lunge 2 x 10  Therapeutic Exercise Activity 8: gait training w/o AD 2 x 60'  Therapeutic Exercise Activity 9: seated R/L HS stretch x 1 min ea  Therapeutic Exercise Activity 10:  seated RTB R/L iso clam x 20 ea  Therapeutic Exercise Activity 11: supine PPT with knees bent and knees extended x 10 ea  Therapeutic Exercise Activity 12: B SAQ with modified bridge 2 x 10         Manual Therapy  Manual Therapy Activity 1: STM L Quad, Hip Abductors  Manual Therapy Activity 2: LLE PROM  Manual Therapy Activity 3: gentle stretch ADD, HS, gluts         Modalities  Modality 1: Untimed Cold Pack (L ant hip x 12' - bell (N/C))           OP EDUCATION:  Outpatient Education  Education Comment: Access Code: [email or web address]  URL: [email or web address]  Date: 05/07/2024  Prepared by: Sraa Mansfield    Exercises  - Seated Hip Abduction with Resistance  - 1 x daily - 7 x weekly - 3 sets - 10 reps  - Seated Isometric Hip Abduction with Resistance  - 1 x daily - 7 x weekly - 3 sets - 10 reps  - Hooklying Isometric Clamshell  - 1 x daily - 7 x weekly - 3 sets - 10 reps  - Hooklying Clamshell with Resistance  - 1 x daily - 7 x weekly - 3 sets - 10 reps    Assessment:  PT Assessment  Assessment Comment: Patient has been experiencing intermittent rad sx into her L shin.  Patient has history of lumbar disc involvement.  Advised patient to take smaller step lengths at this time to minimize ant hip discomfort.  Patient was relieved with CP at the conclusion of today's session.    Plan:  OP PT Plan  Treatment/Interventions: Electrical stimulation, Education/ Instruction, Dry needling, Neuromuscular re-education, Self care/ home management, Therapeutic activities, Therapeutic exercises, Manual therapy  PT Plan: Skilled PT  PT Frequency: 2 times per week  Duration: 6 weeks  Onset Date: 04/02/24  Number of Treatments Authorized: 15 of 30 (12v used previous surgery) (Auth after 30)  Rehab Potential: Good  Plan of Care Agreement: Patient    Goals:  Physical Therapy - R hip, 2/2024 Problems       Physical Therapy - R hip, 2/2024 Problems (Resolved)       PT Problem       STG (Adequate for Discharge)       Start:   02/01/24    Expected End:  02/27/24    Resolved:  04/26/24    1) Patient will improve LEFS score by 9 points in order to perform functional activities at home and in the community.  2) Patient will be able to complete ADLs with pain less than 4/10 in hip.  3) Pt will improve hip flexion ROM by 100 degrees to be able to complete ADLs with less difficult.  4) Patient will be independent with HEP to allow for continued improvement in daily tasks at home and in the community in 3 visits.          LTG (Adequate for Discharge)       Start:  02/01/24    Expected End:  03/28/24    Resolved:  04/26/24    1) Patient will have 5/5 strength in lateral hip musculature to aid in stability with ambulation on varied surfaces in community.  2) Patient will be able to perform proper squatting technique in order to prevent increased pain with daily tasks.  3) Patient will be able to perform >30 seconds of SLS on even ground in order to allow for safe ambulation and reduced fall risk within the community.  4) Patient will improve LEFS score by to >/=70/80 in order to perform functional activities at home and in the community by discharge.              Physical Therapy - L hip, 4/2024 Problems       Physical Therapy - L hip, 4/2024 Problems (Active)       PT Problem       STG       Start:  04/26/24    Expected End:  05/14/24       1) Patient will improve LEFS score by 9 points in order to perform functional activities at home and in the community.  2) Patient will be able to complete ADLs with pain less than 5/10 in hip.  3) Pt will improve hip flexion ROM by 15 degrees to be able to complete ADLs with less difficult.  4) Patient will be independent with HEP to allow for continued improvement in daily tasks at home and in the community in 3 visits.          LTG       Start:  04/26/24    Expected End:  06/05/24       1) Patient will have 4+/5 strength in lateral hip musculature to aid in stability with ambulation on varied surfaces in  community.  2) Patient will be able to perform proper squatting technique in order to prevent increased pain with daily tasks.  3) Patient will be able to perform >30 seconds of SLS on even ground in order to allow for safe ambulation and reduced fall risk within the community.  4) Patient will improve LEFS score by to >/=70/80 in order to perform functional activities at home and in the community by discharge.                 Karina Mansfield, PTA

## 2024-05-10 ENCOUNTER — TREATMENT (OUTPATIENT)
Dept: PHYSICAL THERAPY | Facility: CLINIC | Age: 59
End: 2024-05-10
Payer: COMMERCIAL

## 2024-05-10 DIAGNOSIS — Z96.642 PRESENCE OF LEFT ARTIFICIAL HIP JOINT: ICD-10-CM

## 2024-05-10 DIAGNOSIS — M25.552 LEFT HIP PAIN: Primary | ICD-10-CM

## 2024-05-10 PROCEDURE — 97530 THERAPEUTIC ACTIVITIES: CPT | Mod: GP,CQ

## 2024-05-10 PROCEDURE — 97110 THERAPEUTIC EXERCISES: CPT | Mod: GP,CQ

## 2024-05-10 PROCEDURE — 97112 NEUROMUSCULAR REEDUCATION: CPT | Mod: CQ,GP

## 2024-05-10 NOTE — PROGRESS NOTES
Physical Therapy Treatment    Patient Name: Trupti Medrano  MRN: [id number]  Today's Date: 5/10/2024  Time Calculation  Start Time: 1240  Stop Time: 1331  Time Calculation (min): 51 min   ,      Current Problem  1. Left hip pain        2. Presence of left artificial hip joint            Insurance:  Number of Treatments Authorized: 16 of 30 (12v used previous surgery) (Auth after 30)            Subjective   General  Reason for Referral: s/p L JAMEEL (DOS: 4/2/2024)  Referred By: Dr. Lloyd Pride  Past Medical History Relevant to Rehab: R JAMEEL 1/2/2024, Asthma, Possible HTN (reviewed medical health history - DICK, EDI BRANNON, ERNST)  General Comment: Patient c/o L sided back spasms with prolonged positioning which is more bothersome the the hip sx.  She would like to be able to get on /off the floor.    Performing HEP?: Yes    Precautions  Precautions  STEADI Fall Risk Score (The score of 4 or more indicates an increased risk of falling): 6  Post-Surgical Precautions: Left hip precautions (Anterior Approach)  Precautions Comment: Moderate  Pain       Objective       Treatments:    Therapeutic Exercise  Therapeutic Exercise Activity 1: SciFit Man L2 x 6 min  Therapeutic Exercise Activity 2: thoracic ext over chair  Therapeutic Exercise Activity 3: low back stretch grasping TDM bar  Therapeutic Exercise Activity 4: YTB loop alt hip ABD 2 x 1 min \  Therapeutic Exercise Activity 5: YTB loop alt hip ext - small ROM 2 x 1 min  Therapeutic Exercise Activity 6: T Gym L7 mini squats x 2 min  Therapeutic Exercise Activity 7: supine bridge over bolster x 10    Balance/Neuromuscular Re-Education  Balance/Neuromuscular Re-Education Activity 1: Tilt board #2 controlled taps and balance x 1 min ea  Balance/Neuromuscular Re-Education Activity 2: Tilt board #1 controlled taps and balance x 1 min ea  Balance/Neuromuscular Re-Education Activity 3: lateral stepping along airex pad x 1 min  Balance/Neuromuscular Re-Education Activity 4: SLS  "on airex pad 2 x 30\" ea R/L         Therapeutic Activity  Therapeutic Activity 1: 4\" FWD step up and over R/L WB x 10 ea  Therapeutic Activity 2: 4\" lat step overs x 1 min                OP EDUCATION:  Outpatient Education  Education Comment: re issued HEP given last visit.    Assessment:  PT Assessment  Assessment Comment: Patient is making progress with increased strength and endurance L LE. Improved stair climbing technique on low step.    Plan:  OP PT Plan  Treatment/Interventions: Electrical stimulation, Education/ Instruction, Dry needling, Neuromuscular re-education, Self care/ home management, Therapeutic activities, Therapeutic exercises, Manual therapy  PT Plan: Skilled PT  PT Frequency: 2 times per week  Duration: 6 weeks  Onset Date: 04/02/24  Number of Treatments Authorized: 16 of 30 (12v used previous surgery) (Auth after 30)  Rehab Potential: Good  Plan of Care Agreement: Patient    Goals:  Physical Therapy - R hip, 2/2024 Problems       Physical Therapy - R hip, 2/2024 Problems (Resolved)       PT Problem       STG (Adequate for Discharge)       Start:  02/01/24    Expected End:  02/27/24    Resolved:  04/26/24    1) Patient will improve LEFS score by 9 points in order to perform functional activities at home and in the community.  2) Patient will be able to complete ADLs with pain less than 4/10 in hip.  3) Pt will improve hip flexion ROM by 100 degrees to be able to complete ADLs with less difficult.  4) Patient will be independent with HEP to allow for continued improvement in daily tasks at home and in the community in 3 visits.          LTG (Adequate for Discharge)       Start:  02/01/24    Expected End:  03/28/24    Resolved:  04/26/24    1) Patient will have 5/5 strength in lateral hip musculature to aid in stability with ambulation on varied surfaces in community.  2) Patient will be able to perform proper squatting technique in order to prevent increased pain with daily tasks.  3) Patient " will be able to perform >30 seconds of SLS on even ground in order to allow for safe ambulation and reduced fall risk within the community.  4) Patient will improve LEFS score by to >/=70/80 in order to perform functional activities at home and in the community by discharge.              Physical Therapy - L hip, 4/2024 Problems       Physical Therapy - L hip, 4/2024 Problems (Active)       PT Problem       STG       Start:  04/26/24    Expected End:  05/14/24       1) Patient will improve LEFS score by 9 points in order to perform functional activities at home and in the community.  2) Patient will be able to complete ADLs with pain less than 5/10 in hip.  3) Pt will improve hip flexion ROM by 15 degrees to be able to complete ADLs with less difficult.  4) Patient will be independent with HEP to allow for continued improvement in daily tasks at home and in the community in 3 visits.          LTG       Start:  04/26/24    Expected End:  06/05/24       1) Patient will have 4+/5 strength in lateral hip musculature to aid in stability with ambulation on varied surfaces in community.  2) Patient will be able to perform proper squatting technique in order to prevent increased pain with daily tasks.  3) Patient will be able to perform >30 seconds of SLS on even ground in order to allow for safe ambulation and reduced fall risk within the community.  4) Patient will improve LEFS score by to >/=70/80 in order to perform functional activities at home and in the community by discharge.                 Karina Mansfield, PTA

## 2024-05-13 ENCOUNTER — APPOINTMENT (OUTPATIENT)
Dept: PHYSICAL THERAPY | Facility: CLINIC | Age: 59
End: 2024-05-13
Payer: COMMERCIAL

## 2024-05-13 DIAGNOSIS — Z96.642 PRESENCE OF LEFT ARTIFICIAL HIP JOINT: ICD-10-CM

## 2024-05-13 DIAGNOSIS — M25.552 LEFT HIP PAIN: Primary | ICD-10-CM

## 2024-05-14 ENCOUNTER — TREATMENT (OUTPATIENT)
Dept: PHYSICAL THERAPY | Facility: CLINIC | Age: 59
End: 2024-05-14
Payer: COMMERCIAL

## 2024-05-14 DIAGNOSIS — Z96.642 PRESENCE OF LEFT ARTIFICIAL HIP JOINT: ICD-10-CM

## 2024-05-14 DIAGNOSIS — M25.552 LEFT HIP PAIN: ICD-10-CM

## 2024-05-14 PROCEDURE — 97110 THERAPEUTIC EXERCISES: CPT | Mod: GP,CQ

## 2024-05-14 PROCEDURE — 97112 NEUROMUSCULAR REEDUCATION: CPT | Mod: GP,CQ

## 2024-05-14 ASSESSMENT — PAIN - FUNCTIONAL ASSESSMENT: PAIN_FUNCTIONAL_ASSESSMENT: 0-10

## 2024-05-14 ASSESSMENT — PAIN SCALES - GENERAL: PAINLEVEL_OUTOF10: 0 - NO PAIN

## 2024-05-14 NOTE — PROGRESS NOTES
Physical Therapy Treatment    Patient Name: Trupti Medrano  MRN: [id number]  Today's Date: 5/14/2024  Time Calculation  Start Time: 1354  Stop Time: 1434  Time Calculation (min): 40 min  PT Therapeutic Procedures Time Entry  Neuromuscular Re-Education Time Entry: 10  Therapeutic Exercise Time Entry: 30,      Current Problem  1. Left hip pain  Follow Up In Physical Therapy      2. Presence of left artificial hip joint  Follow Up In Physical Therapy            Insurance:  Payor: [employer] [facility] / Plan: [employer] [facility] / Product Type: *No Product type* /   Number of Treatments Authorized: 17 of 30 (12v used previous surgery) (Auth after 30)          Subjective   General  Reason for Referral: s/p L JAMEEL (DOS: 4/2/2024)  Referred By: Dr. Lloyd Pride  Past Medical History Relevant to Rehab: R JAMEEL 1/2/2024, Asthma, Possible HTN (reviewed medical health history - DICK, ERNST SOLOMON)  General Comment: PT STATES HER HIP HAS BEEN DOING GOOD.  SHE FEELS STRONGER AND MORE STABLE.  SHE HAS HAD REALLY BAD DIZZINESS FOR THE PAST WEEK.  IT HAPPENS THE MOST WHEN SHE LAYS DOWN.  SHE HAS GONE TO URGENT CARE ABOUT IT AND SEES HER PRIMARY ON FRIDAY.    Performing HEP?: Yes    Precautions  Precautions  Post-Surgical Precautions: Left hip precautions (Anterior Approach)  Precautions Comment: Moderate  Pain  Pain Assessment: 0-10  Pain Score: 0 - No pain  Pain Location: Hip  Pain Orientation: Left    Objective   General Observation  General Observation: PT AMB WITHOUT CANE       Treatments:    Therapeutic Exercise  Therapeutic Exercise Activity 1: SciFit Man L2 x 6 min  Therapeutic Exercise Activity 2: GASTTROC STRETCH X 1 MIN  Therapeutic Exercise Activity 3: GENTLE L HIP FLEX STRETCH X 1 MIN  Therapeutic Exercise Activity 4: HEEL RAISES X 1 MIN  Therapeutic Exercise Activity 5: DECLINE SQUATS X 2 MIN // BAR  Therapeutic Exercise Activity 6: FOOTWORM ALONG // BAR YELLOW LOOP X 2 MIN  Therapeutic Exercise Activity  "7: GASTROC STRETCH X 1 MIN  Therapeutic Exercise Activity 8: TG L7 MINI SQUATS X 1 MIN    Balance/Neuromuscular Re-Education  Balance/Neuromuscular Re-Education Activity 1: TILT BOARD S/S, F/B X 2 MIN EACH  Balance/Neuromuscular Re-Education Activity 2: AIREX BEAM BALANCING WITH HEAD TURNS X 2 MIN EACH  Balance/Neuromuscular Re-Education Activity 3: AIREX BEAM SLS L/R X 1 MIN  Balance/Neuromuscular Re-Education Activity 4: 6\" FWD STEP UPS // BAR 2 X 1 MIN    Manual Therapy  Manual Therapy Performed: No                   OP EDUCATION:  Outpatient Education  Education Comment: CONTINUE WITH CURRENT HEP, CAN ADD TBAND WITH HIP ABD    Assessment:  PT Assessment  Assessment Comment: PT MARITZA EX'S WELL. SHE CONTINUES TO PROGRESS WITH HER STRENGTH, FLEXIBILITY, BALANCE AND NORMALIZING GAIT.  NO C/O PAIN IN HER HIP DURING SESSION.    Plan:  OP PT Plan  Treatment/Interventions: Electrical stimulation, Education/ Instruction, Dry needling, Neuromuscular re-education, Self care/ home management, Therapeutic activities, Therapeutic exercises, Manual therapy  PT Plan: Skilled PT  PT Frequency: 2 times per week  Duration: 6 weeks  Onset Date: 04/02/24  Number of Treatments Authorized: 17 of 30 (12v used previous surgery) (Auth after 30)  Rehab Potential: Good  Plan of Care Agreement: Patient    Goals:  Active       PT Problem       STG       Start:  04/26/24    Expected End:  05/14/24       1) Patient will improve LEFS score by 9 points in order to perform functional activities at home and in the community.  2) Patient will be able to complete ADLs with pain less than 5/10 in hip.  3) Pt will improve hip flexion ROM by 15 degrees to be able to complete ADLs with less difficult.  4) Patient will be independent with HEP to allow for continued improvement in daily tasks at home and in the community in 3 visits.          LTG       Start:  04/26/24    Expected End:  06/05/24       1) Patient will have 4+/5 strength in lateral hip " musculature to aid in stability with ambulation on varied surfaces in community.  2) Patient will be able to perform proper squatting technique in order to prevent increased pain with daily tasks.  3) Patient will be able to perform >30 seconds of SLS on even ground in order to allow for safe ambulation and reduced fall risk within the community.  4) Patient will improve LEFS score by to >/=70/80 in order to perform functional activities at home and in the community by discharge.              Scooter Aquino, PTA

## 2024-05-17 ENCOUNTER — OFFICE VISIT (OUTPATIENT)
Dept: PRIMARY CARE | Facility: CLINIC | Age: 59
End: 2024-05-17
Payer: COMMERCIAL

## 2024-05-17 ENCOUNTER — APPOINTMENT (OUTPATIENT)
Dept: PHYSICAL THERAPY | Facility: CLINIC | Age: 59
End: 2024-05-17
Payer: COMMERCIAL

## 2024-05-17 VITALS
WEIGHT: 163.6 LBS | OXYGEN SATURATION: 98 % | BODY MASS INDEX: 28.98 KG/M2 | HEART RATE: 94 BPM | SYSTOLIC BLOOD PRESSURE: 142 MMHG | DIASTOLIC BLOOD PRESSURE: 92 MMHG

## 2024-05-17 DIAGNOSIS — I10 ESSENTIAL (PRIMARY) HYPERTENSION: ICD-10-CM

## 2024-05-17 DIAGNOSIS — R42 DIZZINESS: ICD-10-CM

## 2024-05-17 DIAGNOSIS — R29.898 BILATERAL ARM WEAKNESS: Primary | ICD-10-CM

## 2024-05-17 PROCEDURE — 99214 OFFICE O/P EST MOD 30 MIN: CPT | Performed by: PHYSICIAN ASSISTANT

## 2024-05-17 PROCEDURE — 3077F SYST BP >= 140 MM HG: CPT | Performed by: PHYSICIAN ASSISTANT

## 2024-05-17 PROCEDURE — 3080F DIAST BP >= 90 MM HG: CPT | Performed by: PHYSICIAN ASSISTANT

## 2024-05-17 PROCEDURE — 1036F TOBACCO NON-USER: CPT | Performed by: PHYSICIAN ASSISTANT

## 2024-05-17 RX ORDER — HYDROCHLOROTHIAZIDE 12.5 MG/1
12.5 TABLET ORAL DAILY
Qty: 30 TABLET | Refills: 0 | Status: SHIPPED | OUTPATIENT
Start: 2024-05-17 | End: 2024-06-16

## 2024-05-17 RX ORDER — CYANOCOBALAMIN (VITAMIN B-12) 500 MCG
TABLET ORAL DAILY
COMMUNITY

## 2024-05-17 ASSESSMENT — COLUMBIA-SUICIDE SEVERITY RATING SCALE - C-SSRS
2. HAVE YOU ACTUALLY HAD ANY THOUGHTS OF KILLING YOURSELF?: NO
6. HAVE YOU EVER DONE ANYTHING, STARTED TO DO ANYTHING, OR PREPARED TO DO ANYTHING TO END YOUR LIFE?: NO
1. IN THE PAST MONTH, HAVE YOU WISHED YOU WERE DEAD OR WISHED YOU COULD GO TO SLEEP AND NOT WAKE UP?: NO

## 2024-05-17 ASSESSMENT — ENCOUNTER SYMPTOMS
CHILLS: 0
FEVER: 0
DIZZINESS: 1
SHORTNESS OF BREATH: 0

## 2024-05-17 ASSESSMENT — PAIN SCALES - GENERAL: PAINLEVEL: 2

## 2024-05-17 NOTE — PROGRESS NOTES
Here w/concern of dizziness x 1 week. Describes as off-balance, only occurs w/changing head positions. +nausea, no vomiting. Denies lightheaded, LOC. She did have two recent episodes of paresthesias in the b/l arms, as well as temporary paralysis of R fingers. Physical exam is unremarkable, VSS. No focal neurologic deficits. Declines Mason-Hallpike test. Epley maneuver attempted today, given printout of Epley maneuver to do at home. Can call if sxs persist and will refer to vestibular rehab. MRI to r/o TIA.    Also w/uncontrolled HTN. Home readings 130s-140s. Worsening since recent R JAMEEL. Not currently on any meds but agreeable. Denies CP, dyspnea, HA. Will start w/low dose hydrochlorothiazide and FU in 1mo for labs + BP check.    See student note for full note.

## 2024-05-17 NOTE — PROGRESS NOTES
Subjective   Patient ID: MARY Medrano is a 58 y.o. female who presents for Dizziness (Pt is here for dizziness, whenever she gets up and lays back down /nr).    HPI  Recent R JAMEEL, participating in routine PT. Feeling better w/improved ROM.    Dizzy spells: This is a new problem. ~ 7 days, intermittent, short lasting dizzy spells. Mostly at night when laying down and rolling to move to the other side. Also noticed it when standing, bending, and turning head to the side. Associated nausea. Seems to be getting better.     Finger weakness/paralysis: Reports she was doing hip bridges in bed and experienced an acute episode where she couldn't pull her fingers apart. Lasted a few minutes, has never happened before.    Arm numbness/tingling: Reports she was looking upward towards the alisa and when she relaxed, she had bilateral arm numbness and tingling from the neck down her arms.    HTN - BP elevated. Systolic home readings 130-140s. Increased since recent hip surgery.    Review of Systems   Constitutional:  Negative for chills and fever.   HENT:  Negative for ear pain, hearing loss and tinnitus.    Respiratory:  Negative for shortness of breath.    Cardiovascular:  Negative for chest pain.   Neurological:  Positive for dizziness.       Objective   BP (!) 142/92   Pulse 94   Wt 74.2 kg (163 lb 9.6 oz)   SpO2 98%   BMI 28.98 kg/m²     Physical Exam  HENT:      Right Ear: Tympanic membrane normal.      Left Ear: Tympanic membrane normal.   Eyes:      Extraocular Movements: Extraocular movements intact.      Pupils: Pupils are equal, round, and reactive to light.   Cardiovascular:      Rate and Rhythm: Regular rhythm.      Heart sounds: Normal heart sounds.   Pulmonary:      Breath sounds: Normal breath sounds.   Neurological:      General: No focal deficit present.      Mental Status: She is alert.      Cranial Nerves: No cranial nerve deficit.      Sensory: No sensory deficit.      Motor: No weakness.      Gait: Gait  abnormal.   Psychiatric:         Mood and Affect: Mood normal.         Assessment/Plan   Problem List Items Addressed This Visit    None  Visit Diagnoses         Codes    Bilateral arm weakness    -  Primary R29.898    Relevant Orders    MR brain wo IV contrast    Dizziness     R42    Relevant Orders    MR brain wo IV contrast    Essential (primary) hypertension     I10    Relevant Medications    hydroCHLOROthiazide (Microzide) 12.5 mg tablet

## 2024-05-17 NOTE — PROGRESS NOTES
Subjective   Patient ID: MARY Medrano is a 58 y.o. female who presents for No chief complaint on file..    HPI     Presenting today for UC follow. Patient went to  on  for symptoms of congestion, coughing. No fevers, chills, treated with amoxicillin, feeling better.    Dizzy spells: Mostly at night for 7 days, when laying down and rolling to move to the other side and rolling head . Also noticed it when bending and turning head to the side. Associated nausea. Seems to be getting better.  Low grade constant dizziness.        Objective   There were no vitals taken for this visit.    Physical Exam    Assessment/Plan   {Assess/PlanSmartLinks:76878}

## 2024-05-20 ENCOUNTER — TREATMENT (OUTPATIENT)
Dept: PHYSICAL THERAPY | Facility: CLINIC | Age: 59
End: 2024-05-20
Payer: COMMERCIAL

## 2024-05-20 DIAGNOSIS — Z96.642 PRESENCE OF LEFT ARTIFICIAL HIP JOINT: ICD-10-CM

## 2024-05-20 DIAGNOSIS — M25.552 LEFT HIP PAIN: Primary | ICD-10-CM

## 2024-05-20 PROCEDURE — 97140 MANUAL THERAPY 1/> REGIONS: CPT | Mod: GP,CQ

## 2024-05-20 PROCEDURE — 97110 THERAPEUTIC EXERCISES: CPT | Mod: GP,CQ

## 2024-05-20 PROCEDURE — 97530 THERAPEUTIC ACTIVITIES: CPT | Mod: GP,CQ

## 2024-05-20 ASSESSMENT — PAIN - FUNCTIONAL ASSESSMENT: PAIN_FUNCTIONAL_ASSESSMENT: 0-10

## 2024-05-20 ASSESSMENT — PAIN SCALES - GENERAL: PAINLEVEL_OUTOF10: 0 - NO PAIN

## 2024-05-20 NOTE — PROGRESS NOTES
"  Physical Therapy Treatment    Patient Name: Trupti Medrano  MRN: [id number]  Today's Date: 5/20/2024  Time Calculation  Start Time: 1513  Stop Time: 1609  Time Calculation (min): 56 min   ,      Current Problem  1. Left hip pain  Follow Up In Physical Therapy      2. Presence of left artificial hip joint  Follow Up In Physical Therapy          Insurance:  Number of Treatments Authorized: 18 of 30 (12v used previous surgery) (Auth after 30)            Subjective   General  Reason for Referral: s/p L JAMEEL (DOS: 4/2/2024)  Referred By: Dr. Lloyd Pride  Past Medical History Relevant to Rehab: R JAMEEL 1/2/2024, Asthma, Possible HTN (reviewed medical health history - DICK, ERNST SOLOMON)  General Comment: Patient would like to be able to get on/off the floor.  She c/o L shin pain/rad sx with prolonged standing.  Her L > R LB mm feel tight and can cause pain.  Her L hip feels stonger and more sturdy that her right.    Performing HEP?: Yes    Precautions  Precautions  Post-Surgical Precautions: Left hip precautions (Anterior Approach)  Precautions Comment: Moderate  Pain  Pain Assessment: 0-10  Pain Score: 0 - No pain  Pain Location: Hip  Pain Orientation: Left    Objective       Treatments:    Therapeutic Exercise  Therapeutic Exercise Activity 1: SciFit man L3 x 6 min  Therapeutic Exercise Activity 2: gastrc stretch x 1 min  Therapeutic Exercise Activity 3: decline squats x 2 min  Therapeutic Exercise Activity 4: heel/toe raises x 25 ea  Therapeutic Exercise Activity 5: GTB loop footworm R/L x 40' ea  Therapeutic Exercise Activity 6: GTB loop ZigZag and Monster F/B x 40' ea  Therapeutic Exercise Activity 7: prone lying  Therapeutic Exercise Activity 8: prone R/L QS with foot on 1/2 foam roll 2 x 10 ea  Therapeutic Exercise Activity 9: prone iso glut squeeze 5\" x 10         Manual Therapy  Manual Therapy Activity 1: X hand T/L spine  Manual Therapy Activity 2: CFM L QL and L-para    Therapeutic Activity  Therapeutic " Activity 1: floor <-> standing transfers  Therapeutic Activity 2: prone to standing transfers                OP EDUCATION:  Outpatient Education  Education Comment: Access Code: [email or web address]  URL: [email or web address]  Date: 05/20/2024  Prepared by: Sara Mansfield    Exercises  - Side Stepping with Resistance at Ankles  - 1 x daily - 7 x weekly - 3 sets - 10 reps  - Band Walks  - 1 x daily - 7 x weekly - 3 sets - 10 reps  - Marching Bridge  - 1 x daily - 7 x weekly - 3 sets - 10 reps  - Prone Terminal Knee Extension  - 1 x daily - 7 x weekly - 3 sets - 10 reps  - Prone Gluteal Sets  - 1 x daily - 7 x weekly - 3 sets - 10 reps    Assessment:  PT Assessment  Assessment Comment: Today's session focused on mobility and safe transfers as well as increased resistance for strengtheing.  Issued GTB loop and will use BTB at home for clamshells.  She felt more symmetrical with better gait leaving the department today.  PAtient was given a coupon for Achilles and was advised to get fitted for new shoes.    Plan:  OP PT Plan  Treatment/Interventions: Electrical stimulation, Education/ Instruction, Dry needling, Neuromuscular re-education, Self care/ home management, Therapeutic activities, Therapeutic exercises, Manual therapy  PT Plan: Skilled PT  PT Frequency: 2 times per week  Duration: 6 weeks  Onset Date: 04/02/24  Number of Treatments Authorized: 18 of 30 (12v used previous surgery) (Auth after 30)  Rehab Potential: Good  Plan of Care Agreement: Patient    Goals:  Physical Therapy - R hip, 2/2024 Problems       Physical Therapy - R hip, 2/2024 Problems (Resolved)       PT Problem       STG (Adequate for Discharge)       Start:  02/01/24    Expected End:  02/27/24    Resolved:  04/26/24    1) Patient will improve LEFS score by 9 points in order to perform functional activities at home and in the community.  2) Patient will be able to complete ADLs with pain less than 4/10 in hip.  3) Pt will improve hip  flexion ROM by 100 degrees to be able to complete ADLs with less difficult.  4) Patient will be independent with HEP to allow for continued improvement in daily tasks at home and in the community in 3 visits.          LTG (Adequate for Discharge)       Start:  02/01/24    Expected End:  03/28/24    Resolved:  04/26/24    1) Patient will have 5/5 strength in lateral hip musculature to aid in stability with ambulation on varied surfaces in community.  2) Patient will be able to perform proper squatting technique in order to prevent increased pain with daily tasks.  3) Patient will be able to perform >30 seconds of SLS on even ground in order to allow for safe ambulation and reduced fall risk within the community.  4) Patient will improve LEFS score by to >/=70/80 in order to perform functional activities at home and in the community by discharge.              Physical Therapy - L hip, 4/2024 Problems       Physical Therapy - L hip, 4/2024 Problems (Active)       PT Problem       STG       Start:  04/26/24    Expected End:  05/14/24       1) Patient will improve LEFS score by 9 points in order to perform functional activities at home and in the community.  2) Patient will be able to complete ADLs with pain less than 5/10 in hip.  3) Pt will improve hip flexion ROM by 15 degrees to be able to complete ADLs with less difficult.  4) Patient will be independent with HEP to allow for continued improvement in daily tasks at home and in the community in 3 visits.          LTG       Start:  04/26/24    Expected End:  06/05/24       1) Patient will have 4+/5 strength in lateral hip musculature to aid in stability with ambulation on varied surfaces in community.  2) Patient will be able to perform proper squatting technique in order to prevent increased pain with daily tasks.  3) Patient will be able to perform >30 seconds of SLS on even ground in order to allow for safe ambulation and reduced fall risk within the community.  4)  Patient will improve LEFS score by to >/=70/80 in order to perform functional activities at home and in the community by discharge.                 Karina Mansfield, PTA

## 2024-05-23 ENCOUNTER — TREATMENT (OUTPATIENT)
Dept: PHYSICAL THERAPY | Facility: CLINIC | Age: 59
End: 2024-05-23
Payer: COMMERCIAL

## 2024-05-23 DIAGNOSIS — Z96.642 PRESENCE OF LEFT ARTIFICIAL HIP JOINT: ICD-10-CM

## 2024-05-23 DIAGNOSIS — M25.552 LEFT HIP PAIN: ICD-10-CM

## 2024-05-23 PROCEDURE — 97112 NEUROMUSCULAR REEDUCATION: CPT | Mod: GP | Performed by: PHYSICAL THERAPIST

## 2024-05-23 PROCEDURE — 97110 THERAPEUTIC EXERCISES: CPT | Mod: GP | Performed by: PHYSICAL THERAPIST

## 2024-05-23 ASSESSMENT — PAIN SCALES - GENERAL: PAINLEVEL_OUTOF10: 0 - NO PAIN

## 2024-05-23 ASSESSMENT — PAIN - FUNCTIONAL ASSESSMENT: PAIN_FUNCTIONAL_ASSESSMENT: 0-10

## 2024-05-23 NOTE — PROGRESS NOTES
Physical Therapy Treatment    Patient Name: Trupti Medrano  MRN: [id number]  Today's Date: 5/23/2024  Time Calculation  Start Time: 1205  Stop Time: 1250  Time Calculation (min): 45 min  PT Therapeutic Procedures Time Entry  Neuromuscular Re-Education Time Entry: 10  Therapeutic Exercise Time Entry: 30       Current Problem  1. Left hip pain  Follow Up In Physical Therapy      2. Presence of left artificial hip joint  Follow Up In Physical Therapy          Insurance:  Number of Treatments Authorized: 19 of 30 (12v used previous surgery) (Auth after 30)        Payor: [employer] [facility] / Plan: [employer] [facility] / Product Type: *No Product type* /     Subjective   General  Reason for Referral: s/p L JAMEEL (DOS: 4/2/2024)  Referred By: Dr. Lloyd Pride  Past Medical History Relevant to Rehab: R JAMEEL 1/2/2024, Asthma, Possible HTN (reviewed medical health history - DICK, ERNST SOLOMON)  General Comment: Patient states that she has some increased soreness and stiffness in the L hip but overall is feeling pretty good.    Performing HEP?: Yes    Precautions  Precautions  Post-Surgical Precautions: Left hip precautions (Anterior Approach)  Precautions Comment: Moderate  Pain  Pain Assessment: 0-10  Pain Score: 0 - No pain  Pain Location: Hip  Pain Orientation: Left       Objective   HIP    Hip AROM  R hip flexion: (125°): 105° (#1)  L hip flexion: (125°): 90°  R hip abduction: (45°): 40°  L hip abduction: (45°): 25°  R hip ER: (45°): 38°  L hip ER: (45°): 20°  R hip IR: (45°): 25°  L hip IR: (45°): 15°    Treatments:    Therapeutic Exercise  Therapeutic Exercise Activity 1: SciFit man L3 x 6 min  Therapeutic Exercise Activity 2: Dynamics: High knees, tin soldiers (limited height), butt kicks x 40 feet each  Therapeutic Exercise Activity 3: Decline Squats, 2x10  Therapeutic Exercise Activity 4: Palloff Press, Purple PB  Therapeutic Exercise Activity 5: Prone Plank on knees 10 x 10 secs  Therapeutic Exercise  Activity 6: HEP education on rest days from exercises to allow for recovery, as well as monitoring anxiety and fear of movement which may be impacting progress. (12 mins)    Balance/Neuromuscular Re-Education  Balance/Neuromuscular Re-Education Activity 1: Tiltboard M/L Balance, 4x30 secs  Balance/Neuromuscular Re-Education Activity 2: SL Kickstand RDL to 18 inch step, R/L, 5 lb KB, 2x8  Balance/Neuromuscular Re-Education Activity 3: Step-Up Forward, 8 inch, 2x10    Assessment:  PT Assessment  Assessment Comment: Patient with good tolerance to session today.  Deferred a number of exercises as patient is performing them for HEP. She has increased apprehension and fear with certain movements due to potential for injury and possible dislocation to the hip.  Educated on this apprehension and currently abilities based on restrictions from hip precautions. Today session focused on some progression of strengthening of the trunk and hip.  Will continue to monitor overall response and adjust HEP accordingly as necessary to assist with pogressing strength of upper/lower body and trunk.    Plan:  Treatment/Interventions: Electrical stimulation, Education/ Instruction, Dry needling, Neuromuscular re-education, Self care/ home management, Therapeutic activities, Therapeutic exercises, Manual therapy  PT Plan: Skilled PT  PT Frequency: 2 times per week  Duration: 6 weeks  Onset Date: 04/02/24  Rehab Potential: Good    Goals:       Yonatan Joshi, PT    This note was dictated with voice recognition software. It has not been proofread for grammatical errors, typographical mistakes or other semantic inconsistencies.

## 2024-05-30 ENCOUNTER — TREATMENT (OUTPATIENT)
Dept: PHYSICAL THERAPY | Facility: CLINIC | Age: 59
End: 2024-05-30
Payer: COMMERCIAL

## 2024-05-30 DIAGNOSIS — Z96.642 PRESENCE OF LEFT ARTIFICIAL HIP JOINT: ICD-10-CM

## 2024-05-30 DIAGNOSIS — M25.552 LEFT HIP PAIN: ICD-10-CM

## 2024-05-30 PROCEDURE — 97110 THERAPEUTIC EXERCISES: CPT | Mod: GP | Performed by: PHYSICAL THERAPIST

## 2024-05-30 PROCEDURE — 97140 MANUAL THERAPY 1/> REGIONS: CPT | Mod: GP | Performed by: PHYSICAL THERAPIST

## 2024-05-30 ASSESSMENT — PAIN - FUNCTIONAL ASSESSMENT: PAIN_FUNCTIONAL_ASSESSMENT: 0-10

## 2024-05-30 ASSESSMENT — PAIN SCALES - GENERAL: PAINLEVEL_OUTOF10: 0 - NO PAIN

## 2024-05-30 NOTE — PROGRESS NOTES
"  Physical Therapy Treatment/Progress Note    Patient Name: Trupti Medrano  MRN: [id number]  Today's Date: 5/30/2024  Time Calculation  Start Time: 1245  Stop Time: 1337  Time Calculation (min): 52 min  PT Therapeutic Procedures Time Entry  Manual Therapy Time Entry: 8  Neuromuscular Re-Education Time Entry: 5  Therapeutic Exercise Time Entry: 30       Current Problem  1. Left hip pain  Follow Up In Physical Therapy      2. Presence of left artificial hip joint  Follow Up In Physical Therapy          Insurance:  Number of Treatments Authorized: 20 of 30 (12v used previous surgery) (Auth after 30)        Payor: [employer] [facility] / Plan: [employer] [facility] / Product Type: *No Product type* /     Subjective   General  Reason for Referral: s/p L JAMEEL (DOS: 4/2/2024)  Referred By: Dr. Lloyd Pride  Past Medical History Relevant to Rehab: R JAMEEL 1/2/2024, Asthma, Possible HTN (reviewed medical health history - RLJORDIN, ERNST SOLOMON)  General Comment: Patient states overall her hip is feeling better and continues to improve.  She notes some difficulty with bending forward and getting up off the floor but overall is doing well    Performing HEP?: Yes    Precautions  Precautions  Post-Surgical Precautions: Left hip precautions (Anterior Approach)  Precautions Comment: Moderate  Pain  Pain Assessment: 0-10  Pain Score: 0 - No pain  Pain Location: Hip (#1 (C,V): L anterior hip/thigh, 0-5/10, \"burning/pulling/sharp\")  Pain Orientation: Left       Objective   HIP    Observation  Observation Comment: Protected movements noted with L hip and anterior pelvic position in standing  Hip Palpation/Joint Mobility   Palpation / Joint Mobility Comment: Tenderness and tightness noted with palpation R anterior hip/thigh along TFL and Glut Med/Min    Hip AROM  R hip flexion: (125°): 115°  L hip flexion: (125°): 110°  R hip abduction: (45°): 40°  L hip abduction: (45°): 40°  R hip ER: (45°): 42°  L hip ER: (45°): 40°  R hip IR: " (45°): 32°  L hip IR: (45°): 30°    Specific Lower Extremity MMT  R Iliopsoas: (5/5): 4+/5  L Iliopsoas: (5/5): 4/5  R Gluteals (prone): (5/5): 5/5 (tested in supine)  L Gluteals (prone): (5/5): 4/5 (tested in supine)  R Gluteals (sidelying): (5/5): 4+/5  L Gluteals (sidelying): (5/5): 4-/5  DTR     Special Tests  Other: Squat: Decreased hip and knee flexion with increased forward trunk movement and anterior pelvic tilt  Gait     Flexibility  R hamstrings: Restricted  L hamstrings: Restricted  R hip flexors: Restricted  L hip flexors: Restricted    Outcome Measures: NA this visit       Treatments:    Therapeutic Exercise  Therapeutic Exercise Activity 1: SciFit man L3 x 6 min  Therapeutic Exercise Activity 2: Dynamics: High knees, tin soldiers (limited height), butt kicks x 40 feet each  Therapeutic Exercise Activity 3: HEP education and progression with review on rest days from exercises to allow for recovery, as well as monitoring anxiety and fear of movement which may be impacting progress. (20mins)  Therapeutic Exercise Activity 4: Hip T-Band in Standing Abd/Add/Diagonal Extension x 15 each  Therapeutic Exercise Activity 5: Supine Hip flexor stretch, 3x30 secs    Balance/Neuromuscular Re-Education  Balance/Neuromuscular Re-Education Activity 1: SLS, EO, Firm 10 x 15 secs    Manual Therapy  Manual Therapy Activity 1: STM L Quad, Hip Abductors                   OP EDUCATION:  Outpatient Education  Individual(s) Educated: Patient  Education Provided: Home Exercise Program  Patient/Caregiver Demonstrated Understanding: yes  Education Comment: Access Code: [email or web address]  URL: [email or web address]  Date: 05/30/2024  Prepared by: Yonatan Joshi    Exercises  - Standing Repeated Hip Abduction with Resistance  - 1 x daily - 3 x weekly - 3 sets - 10 reps  - Standing Hip Adduction with Anchored Resistance  - 1 x daily - 3 x weekly - 3 sets - 10 reps  - Standing Hip Extension with Anchored Resistance  (Mirrored)  - 1 x daily - 3 x weekly - 3 sets - 10 reps  - Squat with Chair Touch  - 1 x daily - 3 x weekly - 3 sets - 10 reps  - Single Leg Deadlift with Kettlebell (Mirrored)  - 1 x daily - 3 x weekly - 2 sets - 10 reps  - Single Leg Stance on Foam Pad  - 1 x daily - 3 x weekly - 1 sets - 10 reps - 15 hold  - Supine Hamstring Stretch with Strap  - 1 x daily - 3 x weekly - 1 sets - 3 reps - 30 hold  - Modified Champ Stretch (Mirrored)  - 1 x daily - 3 x weekly - 1 sets - 3 reps - 30 hold  - Supine Posterior Pelvic Tilt  - 1 x daily - 3 x weekly - 1 sets - 20 reps - 5 hold  - Ab Prep  - 1 x daily - 3 x weekly - 2 sets - 10 reps  - Supine Bridge  - 1 x daily - 3 x weekly - 3 sets - 10 reps  - Clamshell with Resistance (Mirrored)  - 1 x daily - 3 x weekly - 3 sets - 10 reps  - Plank on Knees  - 1 x daily - 3 x weekly - 1 sets - 3 reps - 20 hold  - Side Plank on Knees  - 1 x daily - 3 x weekly - 1 sets - 3 reps - 20 hold    Assessment:       Plan:  Treatment/Interventions: Electrical stimulation, Education/ Instruction, Dry needling, Neuromuscular re-education, Self care/ home management, Therapeutic activities, Therapeutic exercises, Manual therapy  PT Plan: Skilled PT (Patient on hold x 2-3 weeks to assess response to HEP and will return at that time for progression and possible D/C to HEP)  PT Frequency: 2 times per week  Duration: 6 weeks  Onset Date: 04/02/24  Rehab Potential: Good    Goals:  Active       PT Problem       STG (Progressing)       Start:  05/30/24    Expected End:  06/20/24       1) Patient will improve LEFS score by 9 points in order to perform functional activities at home and in the community. (GOAL NOT ASSESSED)  2) Patient will be able to complete ADLs with pain less than 5/10 in hip. (GOAL MET)  3) Pt will improve hip flexion ROM by 15 degrees to be able to complete ADLs with less difficult. (GOAL MET)  4) Patient will be independent with HEP to allow for continued improvement in daily tasks  at home and in the community in 3 visits.  (GOAL MET)         LTG (Progressing)       Start:  05/30/24    Expected End:  07/25/24       1) Patient will have 4+/5 strength in lateral hip musculature to aid in stability with ambulation on varied surfaces in community. (GOAL MET)  2) Patient will be able to perform proper squatting technique in order to prevent increased pain with daily tasks. (GOAL IN PROGRESS)  3) Patient will be able to perform >30 seconds of SLS on even ground in order to allow for safe ambulation and reduced fall risk within the community. (GOAL NOT ASSESSED)  4) Patient will improve LEFS score by to >/=70/80 in order to perform functional activities at home and in the community by discharge. (GOAL NOT ASSESSED)              Yonatan Joshi, PT    This note was dictated with voice recognition software. It has not been proofread for grammatical errors, typographical mistakes or other semantic inconsistencies.

## 2024-06-11 DIAGNOSIS — I10 ESSENTIAL (PRIMARY) HYPERTENSION: ICD-10-CM

## 2024-06-12 RX ORDER — HYDROCHLOROTHIAZIDE 12.5 MG/1
12.5 TABLET ORAL DAILY
Qty: 30 TABLET | Refills: 0 | Status: SHIPPED | OUTPATIENT
Start: 2024-06-12 | End: 2024-07-12

## 2024-06-14 ENCOUNTER — HOSPITAL ENCOUNTER (OUTPATIENT)
Dept: RADIOLOGY | Facility: CLINIC | Age: 59
Discharge: HOME | End: 2024-06-14
Payer: COMMERCIAL

## 2024-06-14 DIAGNOSIS — R42 DIZZINESS: ICD-10-CM

## 2024-06-14 DIAGNOSIS — R29.898 BILATERAL ARM WEAKNESS: ICD-10-CM

## 2024-06-14 PROCEDURE — 70551 MRI BRAIN STEM W/O DYE: CPT

## 2024-06-20 ENCOUNTER — APPOINTMENT (OUTPATIENT)
Dept: PHYSICAL THERAPY | Facility: CLINIC | Age: 59
End: 2024-06-20
Payer: COMMERCIAL

## 2024-06-20 DIAGNOSIS — R93.0 ABNORMAL MRI OF THE HEAD: Primary | ICD-10-CM

## 2024-06-24 DIAGNOSIS — I10 ESSENTIAL (PRIMARY) HYPERTENSION: Primary | ICD-10-CM

## 2024-06-24 RX ORDER — POTASSIUM CHLORIDE 750 MG/1
10 TABLET, EXTENDED RELEASE ORAL DAILY
Qty: 30 TABLET | Refills: 1 | Status: SHIPPED | OUTPATIENT
Start: 2024-06-24

## 2024-06-24 RX ORDER — POTASSIUM CHLORIDE 750 MG/1
10 TABLET, EXTENDED RELEASE ORAL DAILY
COMMUNITY
Start: 2024-06-23 | End: 2024-06-24 | Stop reason: SDUPTHER

## 2024-06-24 RX ORDER — HYDROCHLOROTHIAZIDE 25 MG/1
25 TABLET ORAL DAILY
Qty: 30 TABLET | Refills: 1 | Status: SHIPPED | OUTPATIENT
Start: 2024-06-24

## 2024-06-24 RX ORDER — HYDROCHLOROTHIAZIDE 25 MG/1
25 TABLET ORAL DAILY
COMMUNITY
Start: 2024-06-23 | End: 2024-06-24 | Stop reason: SDUPTHER

## 2024-06-24 NOTE — TELEPHONE ENCOUNTER
Pt called stating that she recently got out of the hospital due to having high blood pressure. Pt has already scheduled a follow up appt for 8/5/2024. However, the hospital did change the dosage on her hydrochlorothiazide to 25mg now. Pt state that they only have her a small supply of it and needs a refill. Pt last seen on 5/17/2024.

## 2024-07-16 ENCOUNTER — TELEPHONE (OUTPATIENT)
Dept: PRIMARY CARE | Facility: CLINIC | Age: 59
End: 2024-07-16
Payer: COMMERCIAL

## 2024-07-16 NOTE — TELEPHONE ENCOUNTER
Patient called stating she punctured her finger on a dirty dish hook. Patient recently had hip surgery (4/2024) and he is concerned about it becoming/ causing an infection. Patient is up to date on her tetanus, and has been cleaning it with peroxide. She is not having any symptoms of fever, swelling or discharge form the site.

## 2024-07-17 NOTE — TELEPHONE ENCOUNTER
That's exactly all she needs to do right now is monitor for any excessive pain, redness, swelling, or pus drainage. Also any fever, worsening hip pain, or nausea/vomiting. If she develops any of these sxs, she needs to be evaluated by us, urgent care, or ED

## 2024-08-05 ENCOUNTER — OFFICE VISIT (OUTPATIENT)
Dept: PRIMARY CARE | Facility: CLINIC | Age: 59
End: 2024-08-05
Payer: COMMERCIAL

## 2024-08-05 ENCOUNTER — LAB (OUTPATIENT)
Dept: LAB | Facility: LAB | Age: 59
End: 2024-08-05
Payer: COMMERCIAL

## 2024-08-05 VITALS
BODY MASS INDEX: 29.12 KG/M2 | OXYGEN SATURATION: 98 % | WEIGHT: 159.2 LBS | SYSTOLIC BLOOD PRESSURE: 122 MMHG | DIASTOLIC BLOOD PRESSURE: 80 MMHG | HEART RATE: 75 BPM

## 2024-08-05 DIAGNOSIS — E55.9 VITAMIN D DEFICIENCY: ICD-10-CM

## 2024-08-05 DIAGNOSIS — I10 ESSENTIAL (PRIMARY) HYPERTENSION: ICD-10-CM

## 2024-08-05 DIAGNOSIS — I10 ESSENTIAL (PRIMARY) HYPERTENSION: Primary | ICD-10-CM

## 2024-08-05 LAB
25(OH)D3 SERPL-MCNC: 61 NG/ML (ref 30–100)
ANION GAP SERPL CALC-SCNC: 16 MMOL/L (ref 10–20)
BUN SERPL-MCNC: 14 MG/DL (ref 6–23)
CALCIUM SERPL-MCNC: 9.4 MG/DL (ref 8.6–10.6)
CHLORIDE SERPL-SCNC: 96 MMOL/L (ref 98–107)
CO2 SERPL-SCNC: 28 MMOL/L (ref 21–32)
CREAT SERPL-MCNC: 0.75 MG/DL (ref 0.5–1.05)
EGFRCR SERPLBLD CKD-EPI 2021: >90 ML/MIN/1.73M*2
GLUCOSE SERPL-MCNC: 128 MG/DL (ref 74–99)
POTASSIUM SERPL-SCNC: 4.2 MMOL/L (ref 3.5–5.3)
SODIUM SERPL-SCNC: 136 MMOL/L (ref 136–145)

## 2024-08-05 PROCEDURE — 99213 OFFICE O/P EST LOW 20 MIN: CPT | Performed by: PHYSICIAN ASSISTANT

## 2024-08-05 PROCEDURE — 1036F TOBACCO NON-USER: CPT | Performed by: PHYSICIAN ASSISTANT

## 2024-08-05 PROCEDURE — 36415 COLL VENOUS BLD VENIPUNCTURE: CPT

## 2024-08-05 PROCEDURE — 80048 BASIC METABOLIC PNL TOTAL CA: CPT

## 2024-08-05 PROCEDURE — 82306 VITAMIN D 25 HYDROXY: CPT

## 2024-08-05 PROCEDURE — 3074F SYST BP LT 130 MM HG: CPT | Performed by: PHYSICIAN ASSISTANT

## 2024-08-05 PROCEDURE — 3079F DIAST BP 80-89 MM HG: CPT | Performed by: PHYSICIAN ASSISTANT

## 2024-08-05 RX ORDER — HYDROCHLOROTHIAZIDE 25 MG/1
25 TABLET ORAL DAILY
Qty: 90 TABLET | Refills: 1 | Status: SHIPPED | OUTPATIENT
Start: 2024-08-05

## 2024-08-05 ASSESSMENT — ENCOUNTER SYMPTOMS
SHORTNESS OF BREATH: 0
LIGHT-HEADEDNESS: 0
PALPITATIONS: 0
HEADACHES: 0
DIZZINESS: 0

## 2024-08-05 ASSESSMENT — COLUMBIA-SUICIDE SEVERITY RATING SCALE - C-SSRS
6. HAVE YOU EVER DONE ANYTHING, STARTED TO DO ANYTHING, OR PREPARED TO DO ANYTHING TO END YOUR LIFE?: NO
1. IN THE PAST MONTH, HAVE YOU WISHED YOU WERE DEAD OR WISHED YOU COULD GO TO SLEEP AND NOT WAKE UP?: NO
2. HAVE YOU ACTUALLY HAD ANY THOUGHTS OF KILLING YOURSELF?: NO

## 2024-08-05 ASSESSMENT — PATIENT HEALTH QUESTIONNAIRE - PHQ9
SUM OF ALL RESPONSES TO PHQ9 QUESTIONS 1 AND 2: 0
1. LITTLE INTEREST OR PLEASURE IN DOING THINGS: NOT AT ALL
2. FEELING DOWN, DEPRESSED OR HOPELESS: NOT AT ALL

## 2024-08-05 ASSESSMENT — PAIN SCALES - GENERAL: PAINLEVEL: 0-NO PAIN

## 2024-08-05 NOTE — PROGRESS NOTES
Subjective   Patient ID: MARY Medrano is a 58 y.o. female who presents for Blood Pressure Check (Pt is here for BP check / nr).    HPI   S/p R JAMEEL. Last seen 5/2024, BP elevated in office. Started on hydrochlorothiazide. Tolerating well, BP improved.     Review of Systems   Respiratory:  Negative for shortness of breath.    Cardiovascular:  Negative for chest pain and palpitations.   Skin:  Negative for rash.   Neurological:  Negative for dizziness, light-headedness and headaches.       Objective   /80   Pulse 75   Wt 72.2 kg (159 lb 3.2 oz)   SpO2 98%   BMI 29.12 kg/m²     Physical Exam  Constitutional:       Appearance: Normal appearance.   HENT:      Head: Normocephalic and atraumatic.   Eyes:      Extraocular Movements: Extraocular movements intact.      Conjunctiva/sclera: Conjunctivae normal.   Cardiovascular:      Rate and Rhythm: Normal rate and regular rhythm.      Heart sounds: Normal heart sounds.   Pulmonary:      Effort: Pulmonary effort is normal.      Breath sounds: Normal breath sounds. No wheezing or rhonchi.   Musculoskeletal:      Cervical back: Normal range of motion and neck supple.   Skin:     General: Skin is warm.      Findings: No rash.   Neurological:      General: No focal deficit present.      Mental Status: She is alert.         Assessment/Plan   Problem List Items Addressed This Visit    None  Visit Diagnoses         Codes    Essential (primary) hypertension    -  Primary I10    Relevant Medications    hydroCHLOROthiazide (HYDRODiuril) 25 mg tablet    Other Relevant Orders    Basic Metabolic Panel

## 2024-08-15 ENCOUNTER — TELEPHONE (OUTPATIENT)
Dept: PRIMARY CARE | Facility: CLINIC | Age: 59
End: 2024-08-15
Payer: COMMERCIAL

## 2024-08-15 DIAGNOSIS — M25.552 LEFT HIP PAIN: Primary | ICD-10-CM

## 2024-08-15 DIAGNOSIS — I10 ESSENTIAL (PRIMARY) HYPERTENSION: ICD-10-CM

## 2024-08-15 RX ORDER — MELOXICAM 7.5 MG/1
7.5 TABLET ORAL DAILY PRN
Qty: 30 TABLET | Refills: 0 | Status: SHIPPED | OUTPATIENT
Start: 2024-08-15 | End: 2025-08-15

## 2024-08-15 NOTE — TELEPHONE ENCOUNTER
"Pt states she contacted her surgeon for a flare-up for inflammation & head pain - difficulty walking - he told her he wanted her to start Meloxicam 7.5mg  medication- she states she is out of town - and her surgeon is [place]\" so he is unable to call in the medication - she states there is no covering provider for him - pt insists on requesting this from [facility]     >Requesting to be sent to local pharmacy [facility] on [address] so her sister can  medication, and mail to her. >updated in chart    Last seen: 08/05/24    "

## 2024-08-19 ENCOUNTER — TELEPHONE (OUTPATIENT)
Dept: PRIMARY CARE | Facility: CLINIC | Age: 59
End: 2024-08-19
Payer: COMMERCIAL

## 2024-08-19 DIAGNOSIS — I10 ESSENTIAL (PRIMARY) HYPERTENSION: ICD-10-CM

## 2024-08-19 NOTE — TELEPHONE ENCOUNTER
Pt would like to know if she should still stay on her potassium chloride? If pt does need to, please send refill to [facility] [place]. Pt last seen on 8/5/2024.

## 2024-08-22 RX ORDER — POTASSIUM CHLORIDE 750 MG/1
10 TABLET, EXTENDED RELEASE ORAL DAILY
Qty: 30 TABLET | Refills: 1 | Status: SHIPPED | OUTPATIENT
Start: 2024-08-22

## 2024-09-06 ENCOUNTER — TELEPHONE (OUTPATIENT)
Dept: PRIMARY CARE | Facility: CLINIC | Age: 59
End: 2024-09-06
Payer: COMMERCIAL

## 2024-09-06 NOTE — TELEPHONE ENCOUNTER
Pt left voice message stating that her bp is now lowered. Pt would like to know what to do when her bp gets high again over the weekend. Spoke with Mari, who states that its okay for pt to not check her bp every day. Mari also states that if she is having any chest pain, SOB, and headaches to go to ED. Pt will call on Monday if she is not improving with her headaches. Pt is still out of state, but will go to ED if those symptoms develop.

## 2024-09-17 NOTE — TELEPHONE ENCOUNTER
Patient called to schedule a virtual appointment for chest congestion and issues with her blood pressure. Patient was told appointment will need to be in office due to her concerns, patient cannot be properly treated over the phone. Patient said [place] told her that she can have a virtual appointment for these concerns. There is no documentation of this and [place] and Mari are out of the office. I offered patient to make an in person appointment and patient declined and said [place]. Per Alexandria's previous TE from 9/6, patient was advised to go to ED or urgent care since she is out of the state.

## 2024-09-30 DIAGNOSIS — I10 ESSENTIAL (PRIMARY) HYPERTENSION: ICD-10-CM

## 2024-09-30 RX ORDER — POTASSIUM CHLORIDE 750 MG/1
10 TABLET, EXTENDED RELEASE ORAL DAILY
Qty: 90 TABLET | Refills: 1 | Status: SHIPPED | OUTPATIENT
Start: 2024-09-30

## 2024-10-16 ENCOUNTER — APPOINTMENT (OUTPATIENT)
Dept: NEUROLOGY | Facility: CLINIC | Age: 59
End: 2024-10-16
Payer: COMMERCIAL

## 2024-10-22 ENCOUNTER — TELEPHONE (OUTPATIENT)
Dept: PRIMARY CARE | Facility: CLINIC | Age: 59
End: 2024-10-22
Payer: COMMERCIAL

## 2024-10-22 DIAGNOSIS — K04.7 DENTAL INFECTION: Primary | ICD-10-CM

## 2024-10-22 RX ORDER — AMOXICILLIN AND CLAVULANATE POTASSIUM 875; 125 MG/1; MG/1
875 TABLET, FILM COATED ORAL 2 TIMES DAILY
Qty: 14 TABLET | Refills: 0 | Status: SHIPPED | OUTPATIENT
Start: 2024-10-22 | End: 2024-10-29

## 2024-10-22 NOTE — TELEPHONE ENCOUNTER
[place] Coverage  Patient called myles she is having some tooth pain and bleeding. She is concerned because she had a hip replacement in 2023 and is requesting antibiotics.   Last seen 08/05/24  Next appt 10/31/24

## 2024-10-28 ENCOUNTER — APPOINTMENT (OUTPATIENT)
Dept: NEUROLOGY | Facility: CLINIC | Age: 59
End: 2024-10-28
Payer: COMMERCIAL

## 2024-10-28 ENCOUNTER — LAB (OUTPATIENT)
Dept: LAB | Facility: LAB | Age: 59
End: 2024-10-28
Payer: COMMERCIAL

## 2024-10-28 VITALS
DIASTOLIC BLOOD PRESSURE: 88 MMHG | SYSTOLIC BLOOD PRESSURE: 130 MMHG | BODY MASS INDEX: 29.33 KG/M2 | RESPIRATION RATE: 18 BRPM | HEIGHT: 62 IN | HEART RATE: 72 BPM | WEIGHT: 159.4 LBS

## 2024-10-28 DIAGNOSIS — I10 ESSENTIAL (PRIMARY) HYPERTENSION: ICD-10-CM

## 2024-10-28 DIAGNOSIS — R73.9 HYPERGLYCEMIA: ICD-10-CM

## 2024-10-28 DIAGNOSIS — R73.9 HYPERGLYCEMIA: Primary | ICD-10-CM

## 2024-10-28 DIAGNOSIS — R93.0 ABNORMAL MRI OF THE HEAD: ICD-10-CM

## 2024-10-28 PROBLEM — M25.552 LEFT HIP PAIN: Status: RESOLVED | Noted: 2024-04-26 | Resolved: 2024-10-28

## 2024-10-28 PROBLEM — J01.90 ACUTE NON-RECURRENT SINUSITIS: Status: RESOLVED | Noted: 2023-12-26 | Resolved: 2024-10-28

## 2024-10-28 PROBLEM — K62.5 RECTAL BLEEDING: Status: RESOLVED | Noted: 2023-12-26 | Resolved: 2024-10-28

## 2024-10-28 PROBLEM — K52.9 COLITIS: Status: RESOLVED | Noted: 2023-12-26 | Resolved: 2024-10-28

## 2024-10-28 LAB
EST. AVERAGE GLUCOSE BLD GHB EST-MCNC: 105 MG/DL
HBA1C MFR BLD: 5.3 %

## 2024-10-28 PROCEDURE — 80061 LIPID PANEL: CPT

## 2024-10-28 PROCEDURE — 1036F TOBACCO NON-USER: CPT | Performed by: PSYCHIATRY & NEUROLOGY

## 2024-10-28 PROCEDURE — 36415 COLL VENOUS BLD VENIPUNCTURE: CPT

## 2024-10-28 PROCEDURE — 3008F BODY MASS INDEX DOCD: CPT | Performed by: PSYCHIATRY & NEUROLOGY

## 2024-10-28 PROCEDURE — 83036 HEMOGLOBIN GLYCOSYLATED A1C: CPT

## 2024-10-28 PROCEDURE — 3079F DIAST BP 80-89 MM HG: CPT | Performed by: PSYCHIATRY & NEUROLOGY

## 2024-10-28 PROCEDURE — 99243 OFF/OP CNSLTJ NEW/EST LOW 30: CPT | Performed by: PSYCHIATRY & NEUROLOGY

## 2024-10-28 PROCEDURE — 3075F SYST BP GE 130 - 139MM HG: CPT | Performed by: PSYCHIATRY & NEUROLOGY

## 2024-10-28 ASSESSMENT — PAIN SCALES - GENERAL: PAINLEVEL_OUTOF10: 0-NO PAIN

## 2024-10-29 LAB
CHOLEST SERPL-MCNC: 222 MG/DL (ref 0–199)
CHOLESTEROL/HDL RATIO: 3.4
HDLC SERPL-MCNC: 66.2 MG/DL
LDLC SERPL CALC-MCNC: 145 MG/DL
NON HDL CHOLESTEROL: 156 MG/DL (ref 0–149)
TRIGL SERPL-MCNC: 52 MG/DL (ref 0–149)
VLDL: 10 MG/DL (ref 0–40)

## 2024-10-31 ENCOUNTER — OFFICE VISIT (OUTPATIENT)
Dept: PRIMARY CARE | Facility: CLINIC | Age: 59
End: 2024-10-31
Payer: COMMERCIAL

## 2024-10-31 VITALS
HEART RATE: 87 BPM | DIASTOLIC BLOOD PRESSURE: 78 MMHG | SYSTOLIC BLOOD PRESSURE: 120 MMHG | BODY MASS INDEX: 29.3 KG/M2 | OXYGEN SATURATION: 97 % | WEIGHT: 160.2 LBS

## 2024-10-31 DIAGNOSIS — I10 ESSENTIAL (PRIMARY) HYPERTENSION: ICD-10-CM

## 2024-10-31 DIAGNOSIS — Z13.6 SCREENING, ISCHEMIC HEART DISEASE: Primary | ICD-10-CM

## 2024-10-31 DIAGNOSIS — F41.9 ANXIETY DUE TO INVASIVE PROCEDURE: ICD-10-CM

## 2024-10-31 PROCEDURE — 3074F SYST BP LT 130 MM HG: CPT | Performed by: PHYSICIAN ASSISTANT

## 2024-10-31 PROCEDURE — 99214 OFFICE O/P EST MOD 30 MIN: CPT | Performed by: PHYSICIAN ASSISTANT

## 2024-10-31 PROCEDURE — 3078F DIAST BP <80 MM HG: CPT | Performed by: PHYSICIAN ASSISTANT

## 2024-10-31 PROCEDURE — 1036F TOBACCO NON-USER: CPT | Performed by: PHYSICIAN ASSISTANT

## 2024-10-31 RX ORDER — PROPRANOLOL HYDROCHLORIDE 10 MG/1
10 TABLET ORAL DAILY PRN
Qty: 30 TABLET | Refills: 3 | Status: SHIPPED | OUTPATIENT
Start: 2024-10-31 | End: 2025-10-31

## 2024-10-31 RX ORDER — DIAZEPAM 2 MG/1
2 TABLET ORAL ONCE
Qty: 1 TABLET | Refills: 0 | Status: SHIPPED | OUTPATIENT
Start: 2024-10-31 | End: 2024-10-31

## 2024-10-31 ASSESSMENT — PAIN SCALES - GENERAL: PAINLEVEL_OUTOF10: 0-NO PAIN

## 2024-10-31 ASSESSMENT — ENCOUNTER SYMPTOMS
SHORTNESS OF BREATH: 0
HEADACHES: 1
LIGHT-HEADEDNESS: 0
DIZZINESS: 0

## 2024-12-03 DIAGNOSIS — I10 ESSENTIAL (PRIMARY) HYPERTENSION: ICD-10-CM

## 2024-12-04 ENCOUNTER — APPOINTMENT (OUTPATIENT)
Dept: NEUROLOGY | Facility: CLINIC | Age: 59
End: 2024-12-04
Payer: COMMERCIAL

## 2024-12-04 RX ORDER — POTASSIUM CHLORIDE 750 MG/1
10 TABLET, EXTENDED RELEASE ORAL DAILY
Qty: 90 TABLET | Refills: 1 | Status: SHIPPED | OUTPATIENT
Start: 2024-12-04

## 2024-12-04 RX ORDER — HYDROCHLOROTHIAZIDE 25 MG/1
25 TABLET ORAL DAILY
Qty: 90 TABLET | Refills: 1 | Status: SHIPPED | OUTPATIENT
Start: 2024-12-04

## 2025-01-22 ENCOUNTER — OFFICE VISIT (OUTPATIENT)
Dept: PRIMARY CARE | Facility: CLINIC | Age: 60
End: 2025-01-22
Payer: COMMERCIAL

## 2025-01-22 VITALS
SYSTOLIC BLOOD PRESSURE: 122 MMHG | HEART RATE: 95 BPM | TEMPERATURE: 98.7 F | OXYGEN SATURATION: 95 % | DIASTOLIC BLOOD PRESSURE: 76 MMHG

## 2025-01-22 DIAGNOSIS — J06.9 ACUTE URI: Primary | ICD-10-CM

## 2025-01-22 PROCEDURE — 99386 PREV VISIT NEW AGE 40-64: CPT | Performed by: FAMILY MEDICINE

## 2025-01-22 PROCEDURE — 99213 OFFICE O/P EST LOW 20 MIN: CPT | Performed by: FAMILY MEDICINE

## 2025-01-22 PROCEDURE — 1036F TOBACCO NON-USER: CPT | Performed by: FAMILY MEDICINE

## 2025-01-22 PROCEDURE — 87634 RSV DNA/RNA AMP PROBE: CPT | Performed by: FAMILY MEDICINE

## 2025-01-22 RX ORDER — CETIRIZINE HYDROCHLORIDE 10 MG/1
10 TABLET ORAL DAILY
Qty: 30 TABLET | Refills: 0 | Status: SHIPPED | OUTPATIENT
Start: 2025-01-22 | End: 2025-02-21

## 2025-01-22 RX ORDER — BENZONATATE 200 MG/1
200 CAPSULE ORAL 3 TIMES DAILY PRN
Qty: 42 CAPSULE | Refills: 0 | Status: SHIPPED | OUTPATIENT
Start: 2025-01-22 | End: 2025-02-21

## 2025-01-22 RX ORDER — METHYLPREDNISOLONE 4 MG/1
TABLET ORAL
Qty: 21 TABLET | Refills: 0 | Status: SHIPPED | OUTPATIENT
Start: 2025-01-22

## 2025-01-22 RX ORDER — FLUTICASONE PROPIONATE 50 MCG
1 SPRAY, SUSPENSION (ML) NASAL DAILY
Qty: 16 G | Refills: 0 | Status: SHIPPED | OUTPATIENT
Start: 2025-01-22 | End: 2026-01-22

## 2025-01-22 RX ORDER — AMOXICILLIN AND CLAVULANATE POTASSIUM 875; 125 MG/1; MG/1
875 TABLET, FILM COATED ORAL 2 TIMES DAILY
Qty: 14 TABLET | Refills: 0 | Status: SHIPPED | OUTPATIENT
Start: 2025-01-22 | End: 2025-01-29

## 2025-01-22 RX ORDER — AZELASTINE 1 MG/ML
1 SPRAY, METERED NASAL 2 TIMES DAILY
Qty: 30 ML | Refills: 0 | Status: SHIPPED | OUTPATIENT
Start: 2025-01-22 | End: 2026-01-22

## 2025-01-22 ASSESSMENT — COLUMBIA-SUICIDE SEVERITY RATING SCALE - C-SSRS
6. HAVE YOU EVER DONE ANYTHING, STARTED TO DO ANYTHING, OR PREPARED TO DO ANYTHING TO END YOUR LIFE?: NO
2. HAVE YOU ACTUALLY HAD ANY THOUGHTS OF KILLING YOURSELF?: NO
1. IN THE PAST MONTH, HAVE YOU WISHED YOU WERE DEAD OR WISHED YOU COULD GO TO SLEEP AND NOT WAKE UP?: NO

## 2025-01-22 ASSESSMENT — ENCOUNTER SYMPTOMS
OCCASIONAL FEELINGS OF UNSTEADINESS: 0
DEPRESSION: 0
LOSS OF SENSATION IN FEET: 0

## 2025-01-22 ASSESSMENT — PAIN SCALES - GENERAL: PAINLEVEL_OUTOF10: 4

## 2025-01-22 NOTE — PROGRESS NOTES
59-year-old resents to clinic for new complaints    3-day history of sinus pain, pressure, purulent nasal discharge, cough, sore throat.  Denies fevers or chills presently.  Has a history of chronic sinusitis or recurrent sinus infections.      All pertinent positive symptoms are included in history of present illness.    All other systems have been reviewed and are negative and noncontributory to this patient's current ailments.    CONSTITUTIONAL - INAD. Not ill appearing.  SKIN - No lesions or rashes visualized. Good skin turgor.  HEENT- Head atraumatic, normocephalic. EOMI. JUANJO. Eyes do not have injection. Eyes do not have discharge. Left EAC clear. Right EAC clear, Left TM is not injected and does not have effusion, Right TM is not injected and does not have effusion. Nasal turbinates are not erythematous and don't have drainage, oropharynx is not erythematous and with no exudates, tonsils +0. There is no cervical lymphadenopathy.   RESP - CTAB. No wheezing. No rhonchi. No crackles.   CARDIAC - RRR. No murmurs. No gallops. No rubs.  ABDOMEN - Soft, nontender, nondistended. No organomegaly. Negative McBurney sign and negative Solitario sign  NEURO-CNs 2-12 grossly intact  PSYCH- normal mood and affect      1. Acute URI (Primary)  - this is very likely a viral illness and will resolve on its own.  - for the time being, symptomatic control will help you stay functional and feel better during the day  - be sure to get at least 8 hours of sleep a night to promote recovery, and to drink plenty of fluids every day     **You may have been prescribed one of the below medications, please use them as follows:  Benzonatate 2-3 times during the day for coughing.  Cetirizine once daily at bedtime for rhinorrhea and sinus pain/inflammation/allergy-like symptoms.  This will help with postnasal drip causing sore throat.  Fluticasone nasal spray for sinus pressure and allergy-like symptoms.  This takes a few days to start working,  however.  Azelastine nasal spray in the intermediate will help with rhinorrhea and sinus pain and pressure for the short-term.  An antibiotic was prescribed.  Should your symptoms improve and then begin to worsen again, you may have a secondary bacterial infection which requires the use of an antibiotic.  If this happens, an antibiotic was provided to take.  Do not take the antibiotic otherwise.  Should you need to take the antibiotic and do not see improvement after 2 to 3 days please call the office or visit an emergency department right away.  Please complete the entire course of antibiotic should you decide to take it.  An oral steroid to help reduce inflammation, prevent wheezing/asthma exacerbations, and to relieve symptoms associated with inflammation.       - fluticasone (Flonase) 50 mcg/actuation nasal spray; Administer 1 spray into each nostril once daily. Shake gently. Before first use, prime pump. After use, clean tip and replace cap.  Dispense: 16 g; Refill: 0  - azelastine (Astelin) 137 mcg (0.1 %) nasal spray; Administer 1 spray into each nostril 2 times a day. Use in each nostril as directed  Dispense: 30 mL; Refill: 0  - cetirizine (ZyrTEC) 10 mg tablet; Take 1 tablet (10 mg) by mouth once daily.  Dispense: 30 tablet; Refill: 0  - benzonatate (Tessalon) 200 mg capsule; Take 1 capsule (200 mg) by mouth 3 times a day as needed for cough. Do not crush or chew.  Dispense: 42 capsule; Refill: 0  - methylPREDNISolone (Medrol Dospak) 4 mg tablets; Take as directed on package.  Dispense: 21 tablet; Refill: 0  - amoxicillin-pot clavulanate (Augmentin) 875-125 mg tablet; Take 1 tablet (875 mg) by mouth 2 times a day for 7 days.  Dispense: 14 tablet; Refill: 0  - Sars-CoV-2 and Influenza A/B PCR; Future  - RSV PCR

## 2025-01-23 LAB — RSV RNA RESP QL NAA+PROBE: NOT DETECTED

## 2025-02-17 ENCOUNTER — HOSPITAL ENCOUNTER (OUTPATIENT)
Dept: RADIOLOGY | Facility: CLINIC | Age: 60
Discharge: HOME | End: 2025-02-17
Payer: COMMERCIAL

## 2025-02-17 DIAGNOSIS — Z13.6 SCREENING, ISCHEMIC HEART DISEASE: ICD-10-CM

## 2025-02-17 PROCEDURE — 75571 CT HRT W/O DYE W/CA TEST: CPT

## 2025-02-20 DIAGNOSIS — I10 ESSENTIAL (PRIMARY) HYPERTENSION: ICD-10-CM

## 2025-02-20 RX ORDER — HYDROCHLOROTHIAZIDE 25 MG/1
25 TABLET ORAL DAILY
Qty: 90 TABLET | Refills: 3 | Status: SHIPPED | OUTPATIENT
Start: 2025-02-20

## 2025-04-28 ENCOUNTER — OFFICE VISIT (OUTPATIENT)
Dept: PRIMARY CARE | Facility: CLINIC | Age: 60
End: 2025-04-28
Payer: COMMERCIAL

## 2025-04-28 VITALS
WEIGHT: 165 LBS | SYSTOLIC BLOOD PRESSURE: 120 MMHG | DIASTOLIC BLOOD PRESSURE: 84 MMHG | BODY MASS INDEX: 30.18 KG/M2 | HEART RATE: 75 BPM | OXYGEN SATURATION: 98 %

## 2025-04-28 DIAGNOSIS — Z13.89 SCREENING FOR HEMATURIA OR PROTEINURIA: ICD-10-CM

## 2025-04-28 DIAGNOSIS — R68.89 HEAT INTOLERANCE: ICD-10-CM

## 2025-04-28 DIAGNOSIS — Z12.31 BREAST CANCER SCREENING BY MAMMOGRAM: ICD-10-CM

## 2025-04-28 DIAGNOSIS — M62.838 MUSCLE SPASM: ICD-10-CM

## 2025-04-28 DIAGNOSIS — Z00.00 ROUTINE MEDICAL EXAM: Primary | ICD-10-CM

## 2025-04-28 DIAGNOSIS — J45.20 MILD INTERMITTENT ASTHMA WITHOUT COMPLICATION (HHS-HCC): ICD-10-CM

## 2025-04-28 DIAGNOSIS — E78.5 DYSLIPIDEMIA: ICD-10-CM

## 2025-04-28 DIAGNOSIS — I10 ESSENTIAL (PRIMARY) HYPERTENSION: ICD-10-CM

## 2025-04-28 PROBLEM — S73.199A TEAR OF ACETABULAR LABRUM: Status: RESOLVED | Noted: 2023-12-26 | Resolved: 2025-04-28

## 2025-04-28 PROBLEM — S16.1XXA CERVICAL STRAIN: Status: RESOLVED | Noted: 2023-12-26 | Resolved: 2025-04-28

## 2025-04-28 PROBLEM — S39.012A LUMBAR STRAIN: Status: RESOLVED | Noted: 2023-12-26 | Resolved: 2025-04-28

## 2025-04-28 PROBLEM — M47.816 SPONDYLOSIS WITHOUT MYELOPATHY OR RADICULOPATHY, LUMBAR REGION: Status: ACTIVE | Noted: 2018-09-17

## 2025-04-28 PROBLEM — M16.12 PRIMARY OSTEOARTHRITIS OF LEFT HIP: Status: RESOLVED | Noted: 2023-12-26 | Resolved: 2025-04-28

## 2025-04-28 PROCEDURE — 1036F TOBACCO NON-USER: CPT | Performed by: PHYSICIAN ASSISTANT

## 2025-04-28 PROCEDURE — 99396 PREV VISIT EST AGE 40-64: CPT | Performed by: PHYSICIAN ASSISTANT

## 2025-04-28 PROCEDURE — 3074F SYST BP LT 130 MM HG: CPT | Performed by: PHYSICIAN ASSISTANT

## 2025-04-28 PROCEDURE — 3079F DIAST BP 80-89 MM HG: CPT | Performed by: PHYSICIAN ASSISTANT

## 2025-04-28 RX ORDER — CYCLOBENZAPRINE HCL 5 MG
5 TABLET ORAL NIGHTLY PRN
Qty: 30 TABLET | Refills: 0 | Status: SHIPPED | OUTPATIENT
Start: 2025-04-28 | End: 2025-06-27

## 2025-04-28 ASSESSMENT — PROMIS GLOBAL HEALTH SCALE
CARRYOUT_SOCIAL_ACTIVITIES: GOOD
RATE_QUALITY_OF_LIFE: GOOD
RATE_MENTAL_HEALTH: GOOD
RATE_GENERAL_HEALTH: VERY GOOD
RATE_SOCIAL_SATISFACTION: GOOD
RATE_PHYSICAL_HEALTH: GOOD
EMOTIONAL_PROBLEMS: SOMETIMES
RATE_AVERAGE_FATIGUE: MILD
CARRYOUT_PHYSICAL_ACTIVITIES: COMPLETELY
RATE_AVERAGE_PAIN: 3

## 2025-04-28 ASSESSMENT — PAIN SCALES - GENERAL: PAINLEVEL_OUTOF10: 0-NO PAIN

## 2025-04-28 ASSESSMENT — ENCOUNTER SYMPTOMS
HEADACHES: 0
BLOOD IN STOOL: 0
DIZZINESS: 0
ABDOMINAL PAIN: 0
SHORTNESS OF BREATH: 0
LIGHT-HEADEDNESS: 0

## 2025-04-28 ASSESSMENT — PATIENT HEALTH QUESTIONNAIRE - PHQ9
1. LITTLE INTEREST OR PLEASURE IN DOING THINGS: NOT AT ALL
SUM OF ALL RESPONSES TO PHQ9 QUESTIONS 1 AND 2: 0
2. FEELING DOWN, DEPRESSED OR HOPELESS: NOT AT ALL

## 2025-04-28 NOTE — PROGRESS NOTES
Subjective   Patient ID: MARY Medrano is a 59 y.o. female who presents for Annual Exam (Pt is here for physical / nr).    HPI   Pt managed for hx L JAMEEL, HTN, HLD (CT Ca score 0)    HTN - controlled on hydrochlorothiazide     HLD - TC:HDL 3.4. Dad w/CVA. Nonsmoker. Follows mediterranean diet, exercises regularly. CT Ca score 0.    HM: due for pap - plans on having done next month after 4 weeks of estrace due to severe vaginal atrophy, colonoscopy 7/14/22 - repeat in 5 years, CT Ca score 0. Due for mammogram    Review of Systems   HENT:  Negative for hearing loss.    Eyes:  Negative for visual disturbance.   Respiratory:  Negative for shortness of breath.    Cardiovascular:  Negative for chest pain.   Gastrointestinal:  Negative for abdominal pain and blood in stool.   Genitourinary:  Negative for vaginal bleeding.   Neurological:  Negative for dizziness, light-headedness and headaches.       Objective   /84   Pulse 75   Wt 74.8 kg (165 lb)   SpO2 98%   BMI 30.18 kg/m²     Physical Exam  Vitals reviewed.   Constitutional:       Appearance: Normal appearance.   HENT:      Head: Normocephalic and atraumatic.      Right Ear: Tympanic membrane and ear canal normal.      Left Ear: Tympanic membrane and ear canal normal.      Nose: Nose normal.      Mouth/Throat:      Mouth: Mucous membranes are moist.      Pharynx: No oropharyngeal exudate.   Eyes:      Extraocular Movements: Extraocular movements intact.      Conjunctiva/sclera: Conjunctivae normal.      Pupils: Pupils are equal, round, and reactive to light.   Cardiovascular:      Rate and Rhythm: Normal rate and regular rhythm.      Heart sounds: Normal heart sounds.   Pulmonary:      Effort: Pulmonary effort is normal.      Breath sounds: Normal breath sounds. No wheezing.   Abdominal:      General: There is no distension.      Palpations: Abdomen is soft.      Tenderness: There is no abdominal tenderness.   Musculoskeletal:         General: No tenderness.       Cervical back: Normal range of motion and neck supple.   Skin:     General: Skin is warm and dry.      Findings: No rash.   Neurological:      General: No focal deficit present.      Mental Status: She is alert. Mental status is at baseline.   Psychiatric:         Mood and Affect: Mood normal.         Assessment/Plan   Problem List Items Addressed This Visit           ICD-10-CM    Dyslipidemia E78.5    Essential (primary) hypertension I10    Relevant Orders    Albumin-Creatinine Ratio, Urine Random    Basic Metabolic Panel    Mild intermittent asthma without complication ([facility]) J45.20     Other Visit Diagnoses         Codes      Routine medical exam    -  Primary Z00.00      Heat intolerance     R68.89    Relevant Orders    TSH with reflex to Free T4 if abnormal      Screening for hematuria or proteinuria     Z13.89    Relevant Orders    Urinalysis with Reflex Culture and Microscopic      Breast cancer screening by mammogram     Z12.31    Relevant Orders    BI mammo bilateral screening tomosynthesis      Muscle spasm     M62.838    Relevant Medications    cyclobenzaprine (Flexeril) 5 mg tablet

## 2025-04-28 NOTE — PATIENT INSTRUCTIONS
Sometimes, the brain loses its communication with certain muscles, causing these muscles to become weak.  To compensate for this, other surrounding muscles have to work harder, which can lead to muscle fatigue and frequent spasms. Neurokinetic therapy (NKT) is a type of physical therapy that focuses on re-establishing the connection between the brain and the muscles.     To learn more about NKT or to schedule an appointment, please call:  Alexa Wilson [facility] [facility].  [phone] [email or web address]

## 2025-05-05 ENCOUNTER — TELEPHONE (OUTPATIENT)
Dept: PRIMARY CARE | Facility: CLINIC | Age: 60
End: 2025-05-05
Payer: COMMERCIAL

## 2025-05-05 NOTE — TELEPHONE ENCOUNTER
I don't know of any hormone tests to do for this. We know her estrogen will be low since she is in menopause, so we don't need blood work to tell us this. The only thing I would recommend is a thyroid test which she has on her lab order.    I would recommend she reach out to her women's health provider as they may have more insight on this.

## 2025-05-05 NOTE — TELEPHONE ENCOUNTER
Pt stated she has some upcoming blood tests and asking so she only has to go once can a blood test to check hormone levels be added.     Pt lately have been very sweaty lately and getting rashes.    Last appt: 4/28/25  Next appt: 11/17/25

## 2025-06-04 ENCOUNTER — TELEPHONE (OUTPATIENT)
Dept: PRIMARY CARE | Facility: CLINIC | Age: 60
End: 2025-06-04
Payer: COMMERCIAL

## 2025-06-04 DIAGNOSIS — N95.2 VAGINAL ATROPHY: Primary | ICD-10-CM

## 2025-06-04 DIAGNOSIS — L43.9 LICHEN PLANUS: ICD-10-CM

## 2025-06-04 NOTE — TELEPHONE ENCOUNTER
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 They can not get her in until September so looking for a sooner appt so she doesn't feel this way all summer. Please advise.

## 2025-06-05 ENCOUNTER — OFFICE VISIT (OUTPATIENT)
Dept: PRIMARY CARE | Facility: CLINIC | Age: 60
End: 2025-06-05
Payer: COMMERCIAL

## 2025-06-05 VITALS
OXYGEN SATURATION: 98 % | HEART RATE: 79 BPM | DIASTOLIC BLOOD PRESSURE: 86 MMHG | WEIGHT: 154 LBS | SYSTOLIC BLOOD PRESSURE: 140 MMHG | BODY MASS INDEX: 28.17 KG/M2

## 2025-06-05 DIAGNOSIS — M54.6 ACUTE MIDLINE THORACIC BACK PAIN: Primary | ICD-10-CM

## 2025-06-05 PROBLEM — L29.0 ITCHY ANUS: Status: ACTIVE | Noted: 2025-06-05

## 2025-06-05 PROCEDURE — 1036F TOBACCO NON-USER: CPT | Performed by: PHYSICIAN ASSISTANT

## 2025-06-05 PROCEDURE — 3077F SYST BP >= 140 MM HG: CPT | Performed by: PHYSICIAN ASSISTANT

## 2025-06-05 PROCEDURE — 3079F DIAST BP 80-89 MM HG: CPT | Performed by: PHYSICIAN ASSISTANT

## 2025-06-05 PROCEDURE — 99213 OFFICE O/P EST LOW 20 MIN: CPT | Performed by: PHYSICIAN ASSISTANT

## 2025-06-05 RX ORDER — METHYLPREDNISOLONE 4 MG/1
TABLET ORAL
Qty: 21 TABLET | Refills: 0 | Status: SHIPPED | OUTPATIENT
Start: 2025-06-05 | End: 2025-06-11

## 2025-06-05 ASSESSMENT — ENCOUNTER SYMPTOMS: BACK PAIN: 1

## 2025-06-05 ASSESSMENT — PAIN SCALES - GENERAL: PAINLEVEL_OUTOF10: 8

## 2025-06-05 ASSESSMENT — COLUMBIA-SUICIDE SEVERITY RATING SCALE - C-SSRS
1. IN THE PAST MONTH, HAVE YOU WISHED YOU WERE DEAD OR WISHED YOU COULD GO TO SLEEP AND NOT WAKE UP?: NO
6. HAVE YOU EVER DONE ANYTHING, STARTED TO DO ANYTHING, OR PREPARED TO DO ANYTHING TO END YOUR LIFE?: NO
2. HAVE YOU ACTUALLY HAD ANY THOUGHTS OF KILLING YOURSELF?: NO

## 2025-06-05 NOTE — PROGRESS NOTES
Subjective   Patient ID: MARY Medrano is a 59 y.o. female who presents for Back Pain (Pt is her for back pain, which started last week / nr).    HPI   Has been working with a . Was doing squats w/a shoulder press and felt a twinge in her middle back. Took a 5mg cyclobenzaprine which did help. Has been trying to remain physically active but seems to be worsening her sxs. Pain is worst w/rolling over but is able to flex + twist w/no pain.    Review of Systems   Musculoskeletal:  Positive for back pain.       Objective   /86   Pulse 79   Wt 69.9 kg (154 lb)   SpO2 98%   BMI 28.17 kg/m²     Physical Exam  Musculoskeletal:      Comments: ROM thoracic spine nml. Palpable midline tenderness thoracic spine, no stepoffs.            Assessment/Plan   Problem List Items Addressed This Visit           ICD-10-CM    Acute midline thoracic back pain - Primary M54.6    Relevant Medications    methylPREDNISolone (Medrol Dospak) 4 mg tablets    Other Relevant Orders    XR thoracic spine 3 views    Referral to Physical Therapy           no

## 2025-06-05 NOTE — PATIENT INSTRUCTIONS
Sometimes, the brain loses its communication with certain muscles, causing these muscles to become weak.  To compensate for this, other surrounding muscles have to work harder, which can lead to muscle fatigue and frequent spasms. Neurokinetic therapy (NKT) is a type of physical therapy that focuses on re-establishing the connection between the brain and the muscles.     To learn more about NKT or to schedule an appointment, please call:  [facility].  [phone] [email or web address]

## 2025-06-05 NOTE — TELEPHONE ENCOUNTER
Referral placed to urogynecology but I'm not sure how quickly they will get her in. All specialists backed up unfortunately.

## 2025-06-05 NOTE — TELEPHONE ENCOUNTER
"Subjective:       Patient ID: Cathleen Ramirez is a 68 y.o. female.    Vitals:  height is 5' 8" (1.727 m) and weight is 88.9 kg (196 lb). Her oral temperature is 99.3 °F (37.4 °C). Her blood pressure is 138/61 and her pulse is 89. Her oxygen saturation is 99%.     Chief Complaint: Sore Throat    Patient presents with sore throat and ear pain in both ears . She says she was just here and was swabbed for strep and was negative,  but has not improved . Patient denies sputum of any color . Patient reports she has chronic sinus infections .     Sore Throat    This is a new problem. The current episode started in the past 7 days (five days ). Neither side of throat is experiencing more pain than the other. There has been no fever. The pain is at a severity of 9/10. Associated symptoms include ear pain, a hoarse voice and trouble swallowing. Pertinent negatives include no congestion, coughing, diarrhea, headaches, shortness of breath or vomiting. She has tried nothing for the symptoms. The treatment provided no relief.       Constitution: Negative for chills, fatigue and fever.   HENT: Positive for ear pain, sore throat and trouble swallowing. Negative for congestion.    Neck: Negative for painful lymph nodes.   Cardiovascular: Negative for chest pain and leg swelling.   Eyes: Negative for double vision and blurred vision.   Respiratory: Negative for cough and shortness of breath.    Gastrointestinal: Negative for nausea, vomiting and diarrhea.   Genitourinary: Negative for dysuria, frequency, urgency and history of kidney stones.   Musculoskeletal: Negative for joint pain, joint swelling, muscle cramps and muscle ache.   Skin: Negative for color change, pale, rash and bruising.   Allergic/Immunologic: Negative for seasonal allergies.   Neurological: Negative for dizziness, history of vertigo, light-headedness, passing out and headaches.   Hematologic/Lymphatic: Negative for swollen lymph nodes. " Will notify pt during appt today.      Psychiatric/Behavioral: Negative for nervous/anxious, sleep disturbance and depression. The patient is not nervous/anxious.        Objective:      Physical Exam   Constitutional: She is oriented to person, place, and time. She appears well-developed and well-nourished. She is cooperative.  Non-toxic appearance. She does not have a sickly appearance. She does not appear ill. No distress.   HENT:   Head: Normocephalic and atraumatic.   Right Ear: Hearing, external ear and ear canal normal. A middle ear effusion is present.   Left Ear: Hearing, external ear and ear canal normal. A middle ear effusion is present.   Nose: Mucosal edema present. No rhinorrhea or nasal deformity. No epistaxis. Right sinus exhibits maxillary sinus tenderness. Right sinus exhibits no frontal sinus tenderness. Left sinus exhibits maxillary sinus tenderness. Left sinus exhibits no frontal sinus tenderness.   Mouth/Throat: Uvula is midline and mucous membranes are normal. No trismus in the jaw. Normal dentition. No uvula swelling. Posterior oropharyngeal erythema present. No oropharyngeal exudate or posterior oropharyngeal edema.   Eyes: Conjunctivae and lids are normal. No scleral icterus.   Neck: Trachea normal, full passive range of motion without pain and phonation normal. Neck supple. No neck rigidity. No edema and no erythema present.   Cardiovascular: Normal rate, regular rhythm, normal heart sounds, intact distal pulses and normal pulses.   Pulmonary/Chest: Effort normal and breath sounds normal. No respiratory distress. She has no decreased breath sounds. She has no rhonchi.   Abdominal: Normal appearance.   Musculoskeletal: Normal range of motion. She exhibits no edema or deformity.   Lymphadenopathy:     She has cervical adenopathy.        Right cervical: Superficial cervical adenopathy present.        Left cervical: Superficial cervical adenopathy present.   Neurological: She is alert and oriented to person, place, and time. She  exhibits normal muscle tone. Coordination normal.   Skin: Skin is warm, dry, intact, not diaphoretic and not pale.   Psychiatric: She has a normal mood and affect. Her speech is normal and behavior is normal. Judgment and thought content normal. Cognition and memory are normal.   Nursing note and vitals reviewed.        Assessment:       1. Acute bacterial sinusitis    2. Sore throat        Plan:         Acute bacterial sinusitis  -     azithromycin (Z-STALIN) 250 MG tablet; Take 2 tablets (500 mg total) by mouth once daily for 1 day, THEN 1 tablet (250 mg total) once daily for 4 days. One Z-Pack as directed.  Dispense: 6 tablet; Refill: 0    Sore throat  -     POCT Influenza A/B  -     promethazine-dextromethorphan (PROMETHAZINE-DM) 6.25-15 mg/5 mL Syrp; Take 5 mLs by mouth every 6 (six) hours as needed (May take every 4 hours, PRN. DoNOT take more than 30 mL in 24 hours.).  Dispense: 100 mL; Refill: 0  -     diphenhydrAMINE-aluminum-magnesium hydroxide-simethicone-lidocaine HCl 2%; Swish and spit 10 mLs every 4 (four) hours as needed. Swish and swallow for sore throat  Dispense: 100 mL; Refill: 0         Results for orders placed or performed in visit on 12/13/19   POCT Influenza A/B   Result Value Ref Range    Rapid Influenza A Ag Negative Negative    Rapid Influenza B Ag Negative Negative     Acceptable Yes      Patient Instructions     Always follow your healthcare professional's instructions.    You have received urgent care diagnosis and treatment and you may be released before all of your medical problems are known or treated. Unless you have been given a referral, you (the patient), will arrange for follow-up care as instructed.     Please follow up with your primary care provider within 5-7 days if your signs and symptoms have not resolved or have worsen.     If your condition worsens or fails to improve, I recommend that you receive another evaluation in the emergency room immediately or  contact your primary care office to discuss your concerns.     Your DIAGNOSIS today is Acute Bacterial Sinusitis      What is acute sinusitis?  Sinuses are air-filled spaces in the skull behind the face. They are kept moist and clean by a lining of mucosa. Things such as pollen, smoke, and chemical fumes can irritate the mucosa. It can then swell up. As a response to irritation, the mucosa makes more mucus and other fluids. Tiny hairlike cilia cover the mucosa. Cilia help carry mucus toward the opening of the sinus. Too much mucus may cause the cilia to stop working. This blocks the sinus opening. A buildup of fluid in the sinuses then causes pain and pressure. It can also encourage bacteria to grow in the sinuses.    Common symptoms of acute sinusitis/You may have:  · Facial soreness pain  · Headache  · Fever  · Fluid draining in the back of the throat (postnasal drip)  · Congestion  · Drainage that is thick and colored, instead of clear  · Cough    Diagnosing acute sinusitis  Your doctor will ask about your symptoms and health history. He or she will look at your ear, nose, and throat. You usually won't need to have X-rays taken.    The doctor may take a sample of mucus to check for bacteria. If you have sinusitis that keeps coming back, you may need imaging tests such as X-rays or CAT scans. This will help your doctor check for a structural problem that may be causing the infection.    Treating acute sinusitis  Treatment is aimed at unblocking the sinus opening and helping the cilia work again. You may need to take antihistamine and decongestant medicine. These can reduce inflammation and decrease the amount of fluid your sinuses make. If you have a bacterial infection, you will need to take antibiotic medicine for 10 to 14 days. Take this medicine until it is gone, even if you feel better.    You have been given an antibiotic to treat your condition today.  Even if your symptoms improve, please complete the  medication as directed on the bottle.     Antibiotics work to destroy bacteria. They may also alter the good bacteria in your gut. Use probiotics and/or high culture yogurt about two hours apart from the antibiotic and about one week after the antibiotic to replace the good bacteria and prevent negative gastro intestinal consequences.    Common antibiotic treatments:  Cefdinir, is a third-generation oral cephalosporin, may cause loose, red stools when administered with products that contain iron. Avoid excessive exposure to sunlight or tanning beds. Use an SPF 30 or higher sunblock when outside and wear protective clothing as azithromycin can make you sunburn more easily.     If you have questions about whether you should take your medications with food, you should read the medication instructions provided to you with your medication, or contact your pharmacy.    If you are female and on BCP use additional methods to prevent pregnancy while on antibiotics and for one cycle after.     Symptom management:    Cough Allergy Symptoms Asthma   Tessalon Perles are a non-narcotic cough medicine. It works by numbing the throat and lungs, making the cough reflex less active, it is used to relieve coughing during the day. If you have been given Phenergan DM cough syrup, you do NOT need to take both medications at the same time.    Phenergan DM is a combination medication that is used to treat cough. Phenergan DM works like an antihistamine and cough suppressant. This medication will make you sleepy like Benadryl, have a drying effect, and act on a part of the brain (cough center) to reduce the need to cough. DO NOT take Benadryl and Phenergan together. Take over-the-counter claritin, zyrtec, allegra, or xyzal as directed, these are antihistamines that will work to dry up secretions/mucus. Antihistamines work to block the effects of a certain natural substance (histamine), which causes allergy symptoms.    Use over the counter  "Flonase as directed for sinus congestion and postnasal drip. Proper administration is to "look down at your toes and aim for your nose". The goal is to aim for your nasolabial folds, the creases in your nose. If you feel the medication drip down your throat, you have NOT administered it correctly. If you can "smell the roses" (floral scent), then you have administered it correctly. If you have a history of asthma, expressed a concern about wheezing or have been told you were wheezing during your exam today, you are being given Albuterol. Albuterol is a bronchodilator that relaxes muscles in the airways and increases air flow to the lungs; it is used to treat or prevent bronchospasm, or narrowing of the airways in the lungs.     If you have a history of asthma, expressed a concern about wheezing or have been told you were wheezing during your exam today and are NOT being prescribed Albuterol that is because you have insured me that you have an adequate supply of the drug at home.          Why didn't I get a steroid shot or a medrol dose pack?  It is suggested NOT to use glucocorticoids in the treatment of acute bacterial sinusitis. When given in addition to antibiotics, oral steroids may shorten the time to symptom resolution or improvement (so will the above recommendations). The benefits of steroids are small and, unlike topical glucocorticoids, systemic glucocorticoids possess a significant side effect profile.     Major side effects include:     Skin consequences: Skin thinning and ecchymoses, Cushingoid appearance (rounded face), acne, weight gain, mild hirsutism, facial erythema, and striae.   Eye consequences: Cataracts, increased intraocular pressure, exophthalmos.    Cardiovascular consequences: Fluid retention, premature atherosclerotic disease, and arrhythmias.    GI consequences: Increased risk for adverse gastrointestinal effects, such as gastritis, ulcer formation, and gastrointestinal " bleeding   Bone and muscle consequences: Osteoporosis, osteonecrosis, and myopathy.   Neuropsychiatric effects: Mood disorders, psychosis, memory impairment, and    Metabolic and Endocrine consequences: Suppress the hypothalamic-pituitary-adrenal (HPA) axis and increase blood sugar.   Effects immunity predisposing you to getting a more severe infection and increases your white blood cell count.   Young children -- Growth impairment        Can I have a shot instead of pills?  No. I have diagnosed you with acute bacterial sinusitis and I want you to have a FULL course of antibiotics and not just a one time antibiotic shot. I have discussed above why you did not receive a steroid shot.    Your provider discussed your plan of care with you during your physical exam. It was reviewed once more by the provider when giving you an after visit summary. If the patient is a minor, the discharge instructions were discussed with an adult and that adult acknowledge their understanding of the provider's teaching.

## 2025-06-11 ENCOUNTER — TELEPHONE (OUTPATIENT)
Dept: PRIMARY CARE | Facility: CLINIC | Age: 60
End: 2025-06-11
Payer: COMMERCIAL

## 2025-07-01 DIAGNOSIS — I10 ESSENTIAL (PRIMARY) HYPERTENSION: ICD-10-CM

## 2025-07-01 RX ORDER — POTASSIUM CHLORIDE 750 MG/1
10 TABLET, EXTENDED RELEASE ORAL DAILY
Qty: 90 TABLET | Refills: 1 | Status: SHIPPED | OUTPATIENT
Start: 2025-07-01

## 2025-08-26 ENCOUNTER — APPOINTMENT (OUTPATIENT)
Dept: OBSTETRICS AND GYNECOLOGY | Facility: CLINIC | Age: 60
End: 2025-08-26
Payer: COMMERCIAL

## 2025-11-18 ENCOUNTER — APPOINTMENT (OUTPATIENT)
Dept: OBSTETRICS AND GYNECOLOGY | Facility: CLINIC | Age: 60
End: 2025-11-18
Payer: COMMERCIAL